# Patient Record
Sex: FEMALE | Race: BLACK OR AFRICAN AMERICAN | HISPANIC OR LATINO | Employment: FULL TIME | URBAN - METROPOLITAN AREA
[De-identification: names, ages, dates, MRNs, and addresses within clinical notes are randomized per-mention and may not be internally consistent; named-entity substitution may affect disease eponyms.]

---

## 2017-05-14 ENCOUNTER — HOSPITAL ENCOUNTER (EMERGENCY)
Facility: HOSPITAL | Age: 32
Discharge: HOME | End: 2017-05-14
Attending: EMERGENCY MEDICINE

## 2017-05-14 ENCOUNTER — APPOINTMENT (EMERGENCY)
Dept: RADIOLOGY | Facility: HOSPITAL | Age: 32
End: 2017-05-14

## 2017-05-14 VITALS
HEART RATE: 94 BPM | OXYGEN SATURATION: 100 % | DIASTOLIC BLOOD PRESSURE: 84 MMHG | SYSTOLIC BLOOD PRESSURE: 148 MMHG | WEIGHT: 163 LBS | RESPIRATION RATE: 18 BRPM | TEMPERATURE: 98.1 F

## 2017-05-14 DIAGNOSIS — S89.92XA LEFT KNEE INJURY, INITIAL ENCOUNTER: Primary | ICD-10-CM

## 2017-05-14 PROCEDURE — 73564 X-RAY EXAM KNEE 4 OR MORE: CPT

## 2017-05-14 PROCEDURE — 99283 EMERGENCY DEPT VISIT LOW MDM: CPT

## 2017-05-14 RX ORDER — NAPROXEN 250 MG/1
500 TABLET ORAL ONCE
Status: COMPLETED | OUTPATIENT
Start: 2017-05-14 | End: 2017-05-14

## 2017-05-14 RX ORDER — NAPROXEN 500 MG/1
500 TABLET ORAL 2 TIMES DAILY WITH MEALS
Qty: 14 TABLET | Refills: 0 | Status: ON HOLD | OUTPATIENT
Start: 2017-05-14 | End: 2017-12-04

## 2017-05-14 RX ADMIN — NAPROXEN 500 MG: 250 TABLET ORAL at 18:58

## 2017-05-18 ENCOUNTER — APPOINTMENT (OUTPATIENT)
Dept: OCCUPATIONAL MEDICINE | Facility: CLINIC | Age: 32
End: 2017-05-18
Payer: OTHER MISCELLANEOUS

## 2017-05-18 PROCEDURE — 99213 OFFICE O/P EST LOW 20 MIN: CPT

## 2017-06-14 ENCOUNTER — ALLSCRIPTS OFFICE VISIT (OUTPATIENT)
Dept: OTHER | Facility: OTHER | Age: 32
End: 2017-06-14

## 2017-07-12 ENCOUNTER — ALLSCRIPTS OFFICE VISIT (OUTPATIENT)
Dept: OTHER | Facility: OTHER | Age: 32
End: 2017-07-12

## 2017-11-13 ENCOUNTER — OFFICE VISIT (OUTPATIENT)
Dept: URGENT CARE | Facility: MEDICAL CENTER | Age: 32
End: 2017-11-13
Payer: COMMERCIAL

## 2017-11-13 PROCEDURE — S9088 SERVICES PROVIDED IN URGENT: HCPCS

## 2017-11-13 PROCEDURE — 99213 OFFICE O/P EST LOW 20 MIN: CPT

## 2017-11-14 NOTE — PROGRESS NOTES
Assessment    1  Acute meniscal tear, medial (836 0) (S83 249A)    Plan  Acute meniscal tear, medial    · *1 - [facility]  *  Status: Active  Requested for:13Nov2017  Care Summary provided  : Yes   · Hinged Knee Brace; Status:Complete;   Done: [id number]  she has a meniscal tear on MRI  needs ortho eval and treatment  Chief Complaint    1  Knee Pain  Chief Complaint Free Text Note Form: Left knee pain since May saw PCP for treatment still in pain  History of Present Illness  HPI: 28-year-old female presents with continuing with knee pain  She says she was in a work accident in May  She was seen by workman's comp and orthopedics  She had an MRI with Orthopedics did not have the MRI results  She has continued naproxen with some relief  She says the knee will swell and get inflamed and then get better  She has pain with squatting stair climbing and most movement  Hospital Based Practices Required Assessment:  Pain Assessment  the patient states they have pain  The pain is located in the left knee  The patient describes the pain as sharp, dull and aching  (on a scale of 0 to 10, the patient rates the pain at 4 )  Abuse And Domestic Violence Screen   Yes, the patient is safe at home  -- The patient states no one is hurting them  Depression And Suicide Screen  No, the patient has not had thoughts of hurting themself  No, the patient has not felt depressed in the past 7 days  Prefered [place]  [facility] [place]  Primary Language is  [place]  Readiness To Learn: Receptive  Barriers To Learning: none  Preferred Learning: verbal  Education Completed: disease/condition,-- medications-- and-- treatment/procedure  Teaching Method: verbal  Person Taught: patient  Evaluation Of Learning: verbalized/demonstrated understanding      Active Problems  1  Left knee pain (719 46) (M25 562)    Social History   · Current every day smoker (305 1) (F17 200)    Current Meds   1   No Reported Medications Recorded    Allergies    1  TraMADol HCl TABS    Vitals  Signs   Recorded: [id number] 11:54AM   Temperature: 98 3 F, Temporal  Heart Rate: 74, L Radial  Pulse Quality: Normal, L Radial  Respiration Quality: Normal  Respiration: 18  Systolic: 434, RUE, Sitting  Diastolic: 89, RUE, Sitting  Height: 5 ft 4 in  Weight: 166 lb 8 oz  BMI Calculated: 28 58  BSA Calculated: 1 81  O2 Saturation: 99, RA  Pain Scale: 3    Physical Exam   Constitutional  General appearance: No acute distress, well appearing and well nourished  Musculoskeletal  Inspection/palpation of joints, bones, and muscles: Abnormal  -- Left knee tender palpation along the medial joint line  She is nontender laterally  She has full range of motion  She has pain with valgus  She has pain with Quintin's with no pop  Negative drawers  Message  Return to work or school:   Paul Pepe is under my professional care  She was seen in my office on 11/13/17     She is able to work with limitations (no squatting  no work below waist level  rest knee as needed  )           Signatures   Electronically signed by : Rigo Li, [facility]; Nov 13 2017 12:57PM EST                       (Author)    Electronically signed by : ROSA Green ; Nov 13 2017  5:16PM EST                       (Co-author)

## 2017-11-22 ENCOUNTER — ALLSCRIPTS OFFICE VISIT (OUTPATIENT)
Dept: OTHER | Facility: OTHER | Age: 32
End: 2017-11-22

## 2017-11-22 ENCOUNTER — TRANSCRIBE ORDERS (OUTPATIENT)
Dept: ADMINISTRATIVE | Facility: HOSPITAL | Age: 32
End: 2017-11-22

## 2017-11-22 DIAGNOSIS — M25.562 PAIN IN LEFT KNEE: ICD-10-CM

## 2017-11-22 DIAGNOSIS — Z47.89 ENCOUNTER FOR OTHER ORTHOPEDIC AFTERCARE (CODE): ICD-10-CM

## 2017-11-22 DIAGNOSIS — M25.562 LEFT KNEE PAIN, UNSPECIFIED CHRONICITY: Primary | ICD-10-CM

## 2017-11-23 NOTE — PROGRESS NOTES
Assessment    1  Left knee pain (811 23) (D15 810)    Plan  Left knee pain    · Start: Naproxen 500 MG Oral Tablet; TAKE 1 TABLET EVERY 12 HOURS AS NEEDED   · * MRI KNEE LEFT  WO CONTRAST; Status:Active; Requested for:22Nov2017;     Discussion/Summary  Worsening of the left medial aspect knee pain  I am concerned for propagation of a medial meniscus tear  Plan is as follows:  Weightbearing as tolerated  Will obtain MRI to evaluate patient's tear  Pain medication p r n  Will follow up with patient up MRIs been obtained  Discussed treatment plan with patient and she is in agreement treatment plan  Thank you      Chief Complaint    1  Knee Pain    History of Present Illness  HPI: 59-year-old female with small undersurface tear of the medial meniscus of the left knee  Patient did very well with conservative treatment for the past 6 months  Patient states over the past month she has had increased swelling doing her work activities as well as significant medial joint line pain  Denies any numbness tingling fevers chills  She does take anti-inflammatory medication which takes the edge off the pain      Review of Systems   Constitutional: No fever, no chills, feels well, no tiredness, no recent weight gain or loss  Eyes: No complaints of eyesight problems, no red eyes  ENT: no loss of hearing, no nosebleeds, no sore throat  Cardiovascular: No complaints of chest pain, no palpitations, no leg claudication or lower extremity edema  Respiratory: no compliants of shortness of breath, no wheezing, no cough  Gastrointestinal: no complaints of abdominal pain, no constipation, no nausea or diarrhea, no vomiting, no bloody stools  Genitourinary: no complaints of dysuria, no incontinence  Musculoskeletal: as noted in HPI  Integumentary: no complaints of skin rash or lesion, no itching or dry skin, no skin wounds  Neurological: no complaints of headache, no confusion, no numbness or tingling, no dizziness    Endocrine: No complaints of muscle weakness, no feelings of weakness, no frequent urination, no excessive thirst   Psychiatric: No suicidal thoughts, no anxiety, no feelings of depression  Active Problems  1  Acute meniscal tear, medial (836 0) (S83 249A)  2  Left knee pain (719 46) (M25 562)    Past Medical History    The active problems and past medical history were reviewed and updated today  Surgical History    The surgical history was reviewed and updated today  Family History    The family history was reviewed and updated today  Social History   · Current every day smoker (305 1) (F17 200)  The social history was reviewed and updated today  Current Meds  1  No Reported Medications Recorded    The medication list was reviewed and updated today  Allergies  1  TraMADol HCl TABS    Vitals  Signs     Heart Rate: 85  Respiration: 18  Systolic: 839  Diastolic: 85  Weight: 069 lb 2 08 oz    Physical Exam  Left knee:  No abrasions, no open wounds, mild effusion, medial joint line tenderness, no pain with valgus stress, no lateral joint line tenderness, full range of motion, neurologically and vascularly intact distally  Right knee:  No abrasions, no open wounds, medial joint line tenderness, no lateral joint line tenderness, stable to coronal sagittal plane stress, neurologically and vascularly intact distally      Future Appointments    Date/Time Provider Specialty Site   11/29/2017 03:40 PM VAISHNAVI Chawla   Orthopedic [address] [place] [address] AND SURGICAL [facility]       Signatures   Electronically signed by : VAISHNAVI Barlow ; Nov 22 2017  4:57PM EST                       (Author)

## 2017-11-26 ENCOUNTER — HOSPITAL ENCOUNTER (OUTPATIENT)
Dept: MRI IMAGING | Facility: HOSPITAL | Age: 32
Discharge: HOME | End: 2017-11-26
Attending: ORTHOPAEDIC SURGERY
Payer: COMMERCIAL

## 2017-11-26 DIAGNOSIS — M25.562 PAIN IN LEFT KNEE: ICD-10-CM

## 2017-11-26 PROCEDURE — 73721 MRI JNT OF LWR EXTRE W/O DYE: CPT

## 2017-11-29 ENCOUNTER — ALLSCRIPTS OFFICE VISIT (OUTPATIENT)
Dept: OTHER | Facility: OTHER | Age: 32
End: 2017-11-29

## 2017-12-03 ENCOUNTER — ANESTHESIA EVENT (OUTPATIENT)
Dept: PERIOP | Facility: HOSPITAL | Age: 32
End: 2017-12-03
Payer: OTHER MISCELLANEOUS

## 2017-12-04 ENCOUNTER — HOSPITAL ENCOUNTER (OUTPATIENT)
Facility: HOSPITAL | Age: 32
Setting detail: OUTPATIENT SURGERY
Discharge: HOME | End: 2017-12-04
Attending: ORTHOPAEDIC SURGERY | Admitting: ORTHOPAEDIC SURGERY
Payer: OTHER MISCELLANEOUS

## 2017-12-04 ENCOUNTER — ANESTHESIA (OUTPATIENT)
Dept: PERIOP | Facility: HOSPITAL | Age: 32
End: 2017-12-04
Payer: OTHER MISCELLANEOUS

## 2017-12-04 VITALS
HEIGHT: 64 IN | SYSTOLIC BLOOD PRESSURE: 135 MMHG | WEIGHT: 167 LBS | DIASTOLIC BLOOD PRESSURE: 71 MMHG | OXYGEN SATURATION: 100 % | HEART RATE: 80 BPM | RESPIRATION RATE: 20 BRPM | BODY MASS INDEX: 28.51 KG/M2 | TEMPERATURE: 99.8 F

## 2017-12-04 DIAGNOSIS — Z98.890 STATUS POST ARTHROSCOPY OF LEFT KNEE: Primary | ICD-10-CM

## 2017-12-04 LAB — EXT PREGNANCY TEST URINE: NEGATIVE

## 2017-12-04 PROCEDURE — 81025 URINE PREGNANCY TEST: CPT | Performed by: ORTHOPAEDIC SURGERY

## 2017-12-04 RX ORDER — LIDOCAINE HYDROCHLORIDE AND EPINEPHRINE 10; 10 MG/ML; UG/ML
INJECTION, SOLUTION INFILTRATION; PERINEURAL AS NEEDED
Status: DISCONTINUED | OUTPATIENT
Start: 2017-12-04 | End: 2017-12-04 | Stop reason: HOSPADM

## 2017-12-04 RX ORDER — LIDOCAINE HYDROCHLORIDE 10 MG/ML
INJECTION, SOLUTION INFILTRATION; PERINEURAL AS NEEDED
Status: DISCONTINUED | OUTPATIENT
Start: 2017-12-04 | End: 2017-12-04 | Stop reason: SURG

## 2017-12-04 RX ORDER — FENTANYL CITRATE 50 UG/ML
INJECTION, SOLUTION INTRAMUSCULAR; INTRAVENOUS AS NEEDED
Status: DISCONTINUED | OUTPATIENT
Start: 2017-12-04 | End: 2017-12-04 | Stop reason: SURG

## 2017-12-04 RX ORDER — FENTANYL CITRATE/PF 50 MCG/ML
25 SYRINGE (ML) INJECTION
Status: CANCELLED | OUTPATIENT
Start: 2017-12-04

## 2017-12-04 RX ORDER — LIDOCAINE HYDROCHLORIDE 10 MG/ML
INJECTION, SOLUTION INFILTRATION; PERINEURAL AS NEEDED
Status: DISCONTINUED | OUTPATIENT
Start: 2017-12-04 | End: 2017-12-04 | Stop reason: HOSPADM

## 2017-12-04 RX ORDER — OXYCODONE HYDROCHLORIDE 5 MG/1
TABLET ORAL
Qty: 15 TABLET | Refills: 0 | Status: SHIPPED | OUTPATIENT
Start: 2017-12-04 | End: 2018-02-16

## 2017-12-04 RX ORDER — ONDANSETRON 2 MG/ML
4 INJECTION INTRAMUSCULAR; INTRAVENOUS EVERY 4 HOURS PRN
Status: CANCELLED | OUTPATIENT
Start: 2017-12-04

## 2017-12-04 RX ORDER — BUPIVACAINE HYDROCHLORIDE 2.5 MG/ML
INJECTION, SOLUTION INFILTRATION; PERINEURAL AS NEEDED
Status: DISCONTINUED | OUTPATIENT
Start: 2017-12-04 | End: 2017-12-04 | Stop reason: HOSPADM

## 2017-12-04 RX ORDER — SODIUM CHLORIDE, SODIUM LACTATE, POTASSIUM CHLORIDE, CALCIUM CHLORIDE 600; 310; 30; 20 MG/100ML; MG/100ML; MG/100ML; MG/100ML
125 INJECTION, SOLUTION INTRAVENOUS CONTINUOUS
Status: DISCONTINUED | OUTPATIENT
Start: 2017-12-04 | End: 2017-12-04 | Stop reason: HOSPADM

## 2017-12-04 RX ORDER — PROPOFOL 10 MG/ML
INJECTION, EMULSION INTRAVENOUS AS NEEDED
Status: DISCONTINUED | OUTPATIENT
Start: 2017-12-04 | End: 2017-12-04 | Stop reason: SURG

## 2017-12-04 RX ORDER — ONDANSETRON 2 MG/ML
INJECTION INTRAMUSCULAR; INTRAVENOUS AS NEEDED
Status: DISCONTINUED | OUTPATIENT
Start: 2017-12-04 | End: 2017-12-04 | Stop reason: SURG

## 2017-12-04 RX ORDER — MIDAZOLAM HYDROCHLORIDE 1 MG/ML
INJECTION INTRAMUSCULAR; INTRAVENOUS AS NEEDED
Status: DISCONTINUED | OUTPATIENT
Start: 2017-12-04 | End: 2017-12-04 | Stop reason: SURG

## 2017-12-04 RX ORDER — DIPHENHYDRAMINE HYDROCHLORIDE 50 MG/ML
12.5 INJECTION INTRAMUSCULAR; INTRAVENOUS ONCE AS NEEDED
Status: CANCELLED | OUTPATIENT
Start: 2017-12-04

## 2017-12-04 RX ORDER — SODIUM CHLORIDE, SODIUM LACTATE, POTASSIUM CHLORIDE, CALCIUM CHLORIDE 600; 310; 30; 20 MG/100ML; MG/100ML; MG/100ML; MG/100ML
20 INJECTION, SOLUTION INTRAVENOUS CONTINUOUS
Status: DISCONTINUED | OUTPATIENT
Start: 2017-12-04 | End: 2017-12-04 | Stop reason: HOSPADM

## 2017-12-04 RX ORDER — NAPROXEN 500 MG/1
500 TABLET ORAL AS NEEDED
COMMUNITY
End: 2018-02-16

## 2017-12-04 RX ORDER — MEPERIDINE HYDROCHLORIDE 25 MG/ML
12.5 INJECTION INTRAMUSCULAR; INTRAVENOUS; SUBCUTANEOUS
Status: CANCELLED | OUTPATIENT
Start: 2017-12-04

## 2017-12-04 RX ORDER — FENTANYL CITRATE/PF 50 MCG/ML
25 SYRINGE (ML) INJECTION
Status: DISCONTINUED | OUTPATIENT
Start: 2017-12-04 | End: 2017-12-04 | Stop reason: HOSPADM

## 2017-12-04 RX ADMIN — LIDOCAINE HYDROCHLORIDE 50 MG: 10 INJECTION, SOLUTION INFILTRATION; PERINEURAL at 13:02

## 2017-12-04 RX ADMIN — PROPOFOL 200 MG: 10 INJECTION, EMULSION INTRAVENOUS at 13:02

## 2017-12-04 RX ADMIN — SODIUM CHLORIDE, SODIUM LACTATE, POTASSIUM CHLORIDE, AND CALCIUM CHLORIDE: .6; .31; .03; .02 INJECTION, SOLUTION INTRAVENOUS at 12:51

## 2017-12-04 RX ADMIN — FENTANYL CITRATE 25 MCG: 50 INJECTION, SOLUTION INTRAMUSCULAR; INTRAVENOUS at 13:10

## 2017-12-04 RX ADMIN — FENTANYL CITRATE 25 MCG: 50 INJECTION INTRAMUSCULAR; INTRAVENOUS at 14:21

## 2017-12-04 RX ADMIN — ONDANSETRON 4 MG: 2 INJECTION INTRAMUSCULAR; INTRAVENOUS at 13:02

## 2017-12-04 RX ADMIN — FENTANYL CITRATE 25 MCG: 50 INJECTION INTRAMUSCULAR; INTRAVENOUS at 14:15

## 2017-12-04 RX ADMIN — SODIUM CHLORIDE, SODIUM LACTATE, POTASSIUM CHLORIDE, AND CALCIUM CHLORIDE 125 ML/HR: .6; .31; .03; .02 INJECTION, SOLUTION INTRAVENOUS at 08:23

## 2017-12-04 RX ADMIN — MIDAZOLAM HYDROCHLORIDE 2 MG: 1 INJECTION, SOLUTION INTRAMUSCULAR; INTRAVENOUS at 12:55

## 2017-12-04 RX ADMIN — FENTANYL CITRATE 25 MCG: 50 INJECTION, SOLUTION INTRAMUSCULAR; INTRAVENOUS at 13:34

## 2017-12-04 RX ADMIN — FENTANYL CITRATE 25 MCG: 50 INJECTION, SOLUTION INTRAMUSCULAR; INTRAVENOUS at 13:18

## 2017-12-04 RX ADMIN — FENTANYL CITRATE 25 MCG: 50 INJECTION INTRAMUSCULAR; INTRAVENOUS at 14:24

## 2017-12-04 NOTE — DISCHARGE INSTRUCTIONS
Discharge Instructions - [address] 28 y o  female MRN: [id number]  Unit/Bed#: PACU 02    Weight Bearing Status:                                           Weight bearing as tolerated left lower extremtiy    Pain:  Continue analgesics as directed    Dressing Instructions:   Remove dressings on 12/07/2017  Keep incision clean, dry and intact until follow up appointment  PT/OT:  Continue PT/OT on outpatient basis as directed    Appt Instructions: If you do not have your appointment, please call the clinic at [phone] to f/u with Dr Ayush Méndez in 2 weeks  Otherwise followup as scheduled below:      Contact the office sooner if you experience any increased numbness/tingling in the extremities

## 2017-12-04 NOTE — ANESTHESIA POSTPROCEDURE EVALUATION
Post-Op Assessment Note      CV Status:  Stable    Mental Status:  Alert and awake    Hydration Status:  Euvolemic    PONV Controlled:  Controlled    Airway Patency:  Patent    Post Op Vitals Reviewed: Yes          Staff: CRNA           BP   126/68   Temp   98 0   Pulse  92   Resp 17   SpO2 98%

## 2017-12-04 NOTE — OP NOTE
OPERATIVE REPORT  PATIENT NAME: Augie Reeves    :  1985  MRN: [id number]  Pt Location:  OR ROOM 04    SURGERY DATE: 2017    Surgeon(s) and Role:     * Carlin Velasquez MD - Primary     * TIA Posey-C - [address] [place] [address], MD - Assisting    Preop Diagnosis:  Other tear of medial meniscus, current injury, unspecified knee, initial encounter [S83 249A]    Post-Op Diagnosis Codes:     * Other tear of medial meniscus, current injury, unspecified knee, initial encounter [S83 249A]    Procedure(s) (LRB):  KNEE ARTHROSCOPY; CHONDROPLASTY; SYNOVECTOMY (Left)    Specimen(s):  * No specimens in log *    Estimated Blood Loss:   Minimal    Drains:       Anesthesia Type:   General    Operative Indications: Other tear of medial meniscus, current injury, unspecified knee, initial encounter [S83 249A]    Medial femoral condyle chondral defect, medial plateau chondral defect  Loose body, synovitis    Operative Findings:  ACL/PCL intact  Lateral femoral condyle and lateral tibial plateau normal  Medial femoral condyle with chondral defect to with type 2 chondromalacia at least 5 mm circumferential with chondral flap more inferiorly  Medial tibial plateau 1 5 mm chondral defect  Medial meniscus intact  Lateral meniscus intact  Patellofemoral joint normal  Loose bodies noted within the suprapatellar pouch  Inflamed synovium    Complications:   None    Procedure and Technique:  Patient was seen while in the holding area with the procedure as well as indication was explained the patient's left knee was marked  Patient was then subsequently seen by the anesthesia team and taken into the OR  Patient was then transferred to the OR bed supine  Patient was then sedated and LMA was placed  A tourniquet was placed at the superior aspect of the thigh although not inflated  A bolster was placed lateral to the leg  The distal aspect of the table was then lowered   The left knee was then sterilely prepped and draped  Arthroscopy tower with monitor was available within the room  A time-out was then performed and patient, indication and procedure were all correct  The patient did receive preoperative antibiotics    Initially lidocaine with epinephrine was injected for both the medial and lateral portal   the lateral portal was established  The trocar was then inserted into the suprapatellar pouch followed by the camera  The knee was then insufflated with irrigant  Diagnostic arthroscopy was then performed initially starting with the patellofemoral joint, lateral gutter,  lateral joint, the notch and subsequent the medial joint and medial gutter images were taken throughout arthroscopy  Within the suprapatellar posterior was a loose body, the lateral joint was then visualized demonstrating an intact lateral meniscus and no chondral damage to the lateral femoral condyle and lateral plateau  The ACL and PCL were both intact within the notch  The medial joint was then visualized demonstrating chondral defect to the medial femoral condyle as well as a small pit within the medial tibial plateau  The medial meniscus was intact throughout  There was inflamed synovium noted medially  Our medial portal was then established initially with a spinal needle followed by a skin knife and then the shaver was inserted  Fat pad  was then debrided  for more visualization  Probe was then inserted again demonstrating stable meniscus and no tear  The chondral defect measuring 5 mm was then probed and there was type 2 chondromalacia noted and a small flap inferiorly which appeared stable edges were then debrided  Arthrocare Wand was then inserted and chondroplasty and synovectomy were then performed  Images were taken confirming stable lesion  There was a small lesion noted on the medial plateau which was stable  The loose bodies were then debrided and removed  Final pictures were then taken    The joint was then copiously irrigated and fluid was then removed from the knee  The skin incisions were then closed with 2 O nylon  The joint was then injected with 10 cc of lidocaine mixed with epinephrine  Skin was then cleaned and sterile dressings were applied consisting of Xeroform, 4 x 4, ABD and Webril  A 6 inch Ace was then applied  This is Dr Liyah Butterfield dictating for Dr Deepti Pelayo who was present for the entire procedure       Patient Disposition:  PACU  and extubated and stable    SIGNATURE: Liyah Butterfield MD  DATE: December 4, 2017  TIME: 2:06 PM

## 2017-12-04 NOTE — ANESTHESIA PREPROCEDURE EVALUATION
Review of Systems/Medical History  Patient summary reviewed  Chart reviewed  No history of anesthetic complications     Cardiovascular  Negative cardio ROS Exercise tolerance: good,     Pulmonary  Negative pulmonary ROS ,        GI/Hepatic  Negative GI/hepatic ROS          Negative  ROS        Endo/Other  Negative endo/other ROS      GYN       Hematology  Negative hematology ROS      Musculoskeletal    Comment: Knee injury      Neurology  Negative neurology ROS      Psychology   Negative psychology ROS            Physical Exam    Airway    Mallampati score: III  TM Distance: >3 FB  Neck ROM: full     Dental   No notable dental hx     Cardiovascular  Comment: Negative ROS, Cardiovascular exam normal    Pulmonary  Pulmonary exam normal     Other Findings        Anesthesia Plan  ASA Score- 1       Anesthesia Type- general with ASA Monitors  Additional Monitors:   Airway Plan: ETT and LMA  Comment: I, Dr Carlos Lowe, the attending physician, has personally seen and evaluated the patient prior to anesthetic care  I have reviewed the pre-anesthetic record, and other medical records if appropriate to the anesthetic care  Risks and benefits discussed with patient; patient consented and agrees to proceed  If a CRNA is involved in the case, I have reviewed the CRNA assessment, if present, and agree  The patient is in a suitable condition to proceed with my formulated anesthetic plan          Induction- intravenous  Informed Consent- Anesthetic plan and risks discussed with patient  I personally reviewed this patient with the CRNA  Discussed and agreed on the Anesthesia Plan with the CRNA  Meghana Oliver

## 2017-12-20 ENCOUNTER — ALLSCRIPTS OFFICE VISIT (OUTPATIENT)
Dept: OTHER | Facility: OTHER | Age: 32
End: 2017-12-20

## 2017-12-20 ENCOUNTER — GENERIC CONVERSION - ENCOUNTER (OUTPATIENT)
Dept: OTHER | Facility: OTHER | Age: 32
End: 2017-12-20

## 2017-12-29 ENCOUNTER — GENERIC CONVERSION - ENCOUNTER (OUTPATIENT)
Dept: OBGYN CLINIC | Facility: HOSPITAL | Age: 32
End: 2017-12-29

## 2017-12-29 ENCOUNTER — APPOINTMENT (OUTPATIENT)
Dept: PHYSICAL THERAPY | Facility: CLINIC | Age: 32
End: 2017-12-29
Payer: OTHER MISCELLANEOUS

## 2017-12-29 DIAGNOSIS — Z47.89 ENCOUNTER FOR OTHER ORTHOPEDIC AFTERCARE (CODE): ICD-10-CM

## 2017-12-29 PROCEDURE — G8990 OTHER PT/OT CURRENT STATUS: HCPCS

## 2017-12-29 PROCEDURE — G8991 OTHER PT/OT GOAL STATUS: HCPCS

## 2017-12-29 PROCEDURE — 97161 PT EVAL LOW COMPLEX 20 MIN: CPT

## 2018-01-05 ENCOUNTER — APPOINTMENT (OUTPATIENT)
Dept: PHYSICAL THERAPY | Facility: CLINIC | Age: 33
End: 2018-01-05
Payer: OTHER MISCELLANEOUS

## 2018-01-05 ENCOUNTER — APPOINTMENT (OUTPATIENT)
Dept: PHYSICAL THERAPY | Facility: MEDICAL CENTER | Age: 33
End: 2018-01-05

## 2018-01-10 ENCOUNTER — APPOINTMENT (OUTPATIENT)
Dept: PHYSICAL THERAPY | Facility: CLINIC | Age: 33
End: 2018-01-10
Payer: OTHER MISCELLANEOUS

## 2018-01-10 PROCEDURE — 97110 THERAPEUTIC EXERCISES: CPT

## 2018-01-10 PROCEDURE — 97140 MANUAL THERAPY 1/> REGIONS: CPT

## 2018-01-12 VITALS
HEART RATE: 85 BPM | SYSTOLIC BLOOD PRESSURE: 133 MMHG | RESPIRATION RATE: 18 BRPM | DIASTOLIC BLOOD PRESSURE: 85 MMHG | WEIGHT: 171.13 LBS

## 2018-01-12 NOTE — PROGRESS NOTES
Preliminary Nursing Report                Patient Information    Initial Encounter Entry Date:   2017 4:49 PM EST (Automated Transmission Automated Transmission)       Last Modified:   {ParH  ModifiedOn}              Legal Name: Yomaira Klein        Social Security Number:        YOB: 1985        Age (years): 28        Gender: F        Body Mass Index (BMI): 29 kg/m2        Height: 64 in  Weight: 171 lbs (78 kgs)           Address:   [address] [place] [address]              Phone: [phone]   (consent to leave messages)        Email:        Ethnicity: Decline to State        Adventism:        Marital Status:        Preferred Language: English        Race: Other Race                    Patient Insurance Information        Primary Insurance Information Carrier Name: {Primary  CarrierName}           Carrier Address:   {Primary  CarrierAddress}              Carrier Phone: {Primary  CarrierPhone}          Group Number: {Primary  GroupNumber}          Policy Number: {Primary  PolicyNumber}          Insured Name: {Primary  InsuredName}          Insured : {Primary  InsuredDOB}          Relationship to Insured: {Primary  RelationshiptoInsured}           Secondary Insurance Information Carrier Name: {Secondary  CarrierName}           Carrier Address:   {Secondary  CarrierAddress}              Carrier Phone: {Secondary  CarrierPhone}          Group Number: {Secondary  GroupNumber}          Policy Number: {Secondary  PolicyNumber}          Insured Name: {Secondary  InsuredName}          Insured : {Secondary  InsuredDOB}          Relationship to Insured: {Secondary  RelationshiptoInsured}                       Health Profile   Booking #:   Anu Jefferson #: [id number]               DOS: 2017    Surgery : KNEE ARTHROSCOPY/SURGERY    Hira Po     Add'l Procedures/Notes:     Surgery Risk: Intermediate          Precautions          Allergies    TraMADol HCl TABS Clinical Comments: Reaction Type: , Reaction: , Severity:              Medications    Naproxen 500 MG Oral Tablet               Conditions    Acute meniscal tear, medial       Left knee pain               Family History    None             Surgical History    None             Social History    Current every day smoker                               Patient Instructions       Medical Procedure Risk  NPO Instructions   The day before surgery it is recommended to have a light dinner at your usual time and you are allowed a light snack early in the evening  Do not eat anything heavy or eat a big meal after 7pm  Do not eat or drink anything after midnight prior to your surgery  If you are supposed to take any of your medications, do so with a sip of water  Failure to follow these instructions can lead to an increased risk of lung complications and may result in a delay or cancellation of your procedure  If you have any questions, contact your institution for further instructions  No candy, no gum, no mints, no chewing tobacco          Naproxen 500 MG Oral Tablet  Medication Instruction (NSAID - Pain Medication) 61  Please stop ibuprofen, naproxen and other non-steroidal anti-inflammatory drugs (NSAIDS) for 24 hrs before surgery  Current every day smoker  Smoking Cessation   Smoking before and after surgery can lead to complications  Patients who quit smoking at least eight weeks before surgery have complication rates almost as low as non-smokers  Smokers who can stop smoking 24 or 48 hours before surgery may also benefit from decreased amounts of nicotine and carbon monoxide in the body  For help quitting smoking, speak with your physician or contact the [facility] Stephen or American [employer] Association  Please visit the following web address for assistance with quitting  [facility] Signal Patterns  [facility]/Anesthesia-Topics/Lvp-Wuj-oz-Quit-Smoking  aspx  Testing Considerations       ? Pregnancy Test t  Consider a urine pregnancy test on the morning of surgery  Triggered by: Age or Facility Rec, Gender               Consultations       No recommendations for this classification  Miscellaneous Questions         Question: Are you able to walk up a flight of stairs, walk up a hill or do heavy housework WITHOUT having chest pain or shortness of breath? Answer: YES                   Allergies/Conditions/Medications Not Found        The following were not recognized by our system when generating the recommendations  Please consider if this would impact any preoperative protocols  None                  Appointment Information         Date:    12/04/2017        Location:    [facility]        Address:           Directions:                      Footnotes revision 14      ?? Denotes a free-text entry  Legal Disclaimer: Any and all recommendations and services provided herein are designed to assist in the preoperative care of the patient  Nothing contained herein is designed to replace, eliminate or alleviate the responsibility of the attending physician to supervise and determine the patient?s preoperative care and course of treatment  Failure to provide complete, accurate information may negatively impact the system?s ability to recommend the proper preoperative protocol  THE ATTENDING PHYSICIAN IS RESPONSIBLE TO REVIEW THE SUGGESTED PREOPERATIVE PROTOCOLS/COURSE OF TREATMENT AND PRESCRIBE THE FINAL COURSE OF PREOPERATIVE TREATMENT IN CONSULTATION WITH THE PATIENT  THE ePREOP SYSTEM AND ITS MATERIALS ARE PROVIDED ? AS IS? WITHOUT WARRANTY OF ANY KIND, EXPRESS OR IMPLIED, INCLUDING, BUT NOT LIMITED TO, WARRANTIES OF PERFORMANCE OR MERCHANTABILITY OR FITNESS FOR A PARTICULAR PURPOSE  PATIENT AND PHYSICIANS HEREBY AGREE THAT THEIR USE OF THE MATERIALS AND RESOURCES ACT AS A CONSENT TO RELEASE AND WAIVE ePREOP FROM ANY AND ALL CLAIMS OF WARRANTY, TORT OR CONTRACT LAW OF ANY KIND

## 2018-01-13 ENCOUNTER — APPOINTMENT (OUTPATIENT)
Dept: URGENT CARE | Facility: MEDICAL CENTER | Age: 33
End: 2018-01-13
Payer: OTHER MISCELLANEOUS

## 2018-01-13 VITALS
WEIGHT: 165.13 LBS | DIASTOLIC BLOOD PRESSURE: 90 MMHG | HEART RATE: 76 BPM | BODY MASS INDEX: 28.34 KG/M2 | RESPIRATION RATE: 18 BRPM | SYSTOLIC BLOOD PRESSURE: 127 MMHG

## 2018-01-13 VITALS
WEIGHT: 167.25 LBS | SYSTOLIC BLOOD PRESSURE: 114 MMHG | DIASTOLIC BLOOD PRESSURE: 78 MMHG | RESPIRATION RATE: 18 BRPM | HEIGHT: 64 IN | HEART RATE: 98 BPM | BODY MASS INDEX: 28.55 KG/M2

## 2018-01-13 PROCEDURE — 99213 OFFICE O/P EST LOW 20 MIN: CPT

## 2018-01-14 VITALS
BODY MASS INDEX: 28.86 KG/M2 | HEART RATE: 86 BPM | WEIGHT: 168.13 LBS | RESPIRATION RATE: 20 BRPM | SYSTOLIC BLOOD PRESSURE: 123 MMHG | DIASTOLIC BLOOD PRESSURE: 84 MMHG

## 2018-01-18 ENCOUNTER — APPOINTMENT (OUTPATIENT)
Dept: PHYSICAL THERAPY | Facility: CLINIC | Age: 33
End: 2018-01-18
Payer: OTHER MISCELLANEOUS

## 2018-01-18 PROCEDURE — 97014 ELECTRIC STIMULATION THERAPY: CPT

## 2018-01-18 PROCEDURE — 97140 MANUAL THERAPY 1/> REGIONS: CPT

## 2018-01-18 PROCEDURE — 97110 THERAPEUTIC EXERCISES: CPT

## 2018-01-18 NOTE — MISCELLANEOUS
Message  Return to work or school:   Aaliyah Madera is under my professional care  She was seen in my office on 11/13/17     She is able to work with limitations (no squatting  no work below waist level  rest knee as needed  )           Signatures   Electronically signed by : Sammy Cohen, [facility]; Nov 13 2017 12:57PM EST                       (Author)

## 2018-01-19 ENCOUNTER — APPOINTMENT (OUTPATIENT)
Dept: PHYSICAL THERAPY | Facility: CLINIC | Age: 33
End: 2018-01-19
Payer: OTHER MISCELLANEOUS

## 2018-01-19 PROCEDURE — 97014 ELECTRIC STIMULATION THERAPY: CPT

## 2018-01-19 PROCEDURE — 97110 THERAPEUTIC EXERCISES: CPT

## 2018-01-22 VITALS
SYSTOLIC BLOOD PRESSURE: 126 MMHG | RESPIRATION RATE: 18 BRPM | HEART RATE: 99 BPM | WEIGHT: 171.13 LBS | DIASTOLIC BLOOD PRESSURE: 83 MMHG | BODY MASS INDEX: 29.37 KG/M2

## 2018-01-23 NOTE — MISCELLANEOUS
Message  Return to work or school:   Marine Lee is under my professional care  She was seen in my office on 12/20/2017   She is able to return to work on  12/26/2017      Please allow Ms Harmon to return to work with light duty  I will see her in the next 4 weeks to likely return her to full duty  Thank you          Signatures   Electronically signed by : VAISHNAVI Harris ; Dec 20 2017 10:29AM EST                       (Author)    Electronically signed by : VAISHNAVI Harris ; Dec 20 2017 10:38AM EST                       (Author)

## 2018-01-23 NOTE — MISCELLANEOUS
Message  Return to work or school:   Ngoc Doardo is under my professional care  She was seen in my office on 12/20/2017     She is able to work with limitations  No bending pushing pulling kneeling squatting, standing for long periods of time and lifting greater than 20 lb until further follow-up  Samm Tavares PA-C  Signatures   Electronically signed by :  Theodora Jackson; Dec 21 2017  4:35PM EST                       (Author)

## 2018-01-24 ENCOUNTER — APPOINTMENT (OUTPATIENT)
Dept: PHYSICAL THERAPY | Facility: CLINIC | Age: 33
End: 2018-01-24
Payer: OTHER MISCELLANEOUS

## 2018-01-24 ENCOUNTER — OFFICE VISIT (OUTPATIENT)
Dept: OBGYN CLINIC | Facility: MEDICAL CENTER | Age: 33
End: 2018-01-24

## 2018-01-24 VITALS
SYSTOLIC BLOOD PRESSURE: 125 MMHG | DIASTOLIC BLOOD PRESSURE: 85 MMHG | WEIGHT: 174 LBS | BODY MASS INDEX: 29.71 KG/M2 | HEART RATE: 112 BPM | HEIGHT: 64 IN

## 2018-01-24 DIAGNOSIS — S83.412A SPRAIN OF MEDIAL COLLATERAL LIGAMENT OF LEFT KNEE, INITIAL ENCOUNTER: Primary | ICD-10-CM

## 2018-01-24 PROBLEM — Z47.89 AFTERCARE FOLLOWING OTHER SURGERY OF MUSCULOSKELETAL SYSTEM: Status: ACTIVE | Noted: 2018-01-24

## 2018-01-24 PROCEDURE — 99024 POSTOP FOLLOW-UP VISIT: CPT | Performed by: ORTHOPAEDIC SURGERY

## 2018-01-24 NOTE — LETTER
January 24, 2018     Patient: Micheal Olsen   YOB: 1985   Date of Visit: 1/24/2018       To Whom it May Concern:    Micheal Olsen is under my professional care  She was seen in my office on 1/24/2018  She may return to work with limitations 1/25/2018  Light duty only until further follow-up in 4 weeks time  If you have any questions or concerns, please don't hesitate to call           Sincerely,          Rylee Whitley MD        CC: No Recipients

## 2018-01-24 NOTE — PROGRESS NOTES
Rachelle Burt 28 y o  female MRN: [id number]  1/24/2018    Discussion/Summary    28 y  o female status post left knee arthroscopy partial Medial meniscectomy  MCL strain from recent fall at work    His knee brace well at work    Weightbearing as tolerated lower extremity    Physical therapy range of motion strengthening    Follow-up in 4 weeks time for repeat evaluation and possible advancement of work restrictions    Chief Complaint   Knee Pain    Post-Op  Post-Op Arthroscopic Knee:   Rachelle Burt is status post left knee arthroscopy partial Medial meniscectomy  HPI: The patient reports no excessive pain, no swelling, no fever, no calf swelling, no leg pain and no shortness of breath  The patient also reports weight-bearing as tolerated  Patient states she did have another fall at work several days ago she noticed increasing pain in medial aspect of her left knee  She states occasionally having instability in her left knee  Denies any clicking popping catching of the left knee     PE:   --------------------            01/24/18               1054     --------------------   BP:       125/85     Pulse:   (!) 112    --------------------  The surgical incision site was clean, dry and intact  The left knee demonstrates no warmth, no induration, no erythema, no ecchymosis, no swelling and no tenderness  -ROM as expected given post-op status  0-120 degrees with some pain at flexion The patient is progressing well with ROM  -Strength as expected given post-op status  The patient is progressing well with strength  Pain with valgus stress pain palpation MCL no pain palpation lateral joint line  -Tests for DVT and compartment syndrome are both negative with soft and non-tender calves and no palpable cords  Special tests are deferred    -Peripheral neurovascular exam reveals intact sensation to light touch and intact gross motor function       Assessment: Post-op, the patient is doing well, has strained her MCL and is showing no signs of infection  Plan: Activity Restrictions: Advance as tolerated  Hinged knee brace while at work  Light duty only at work x4 weeks    Current Outpatient Prescriptions:  naproxen (NAPROSYN) 500 mg tablet, Take 500 mg by mouth as needed for mild pain, Disp: , Rfl:   oxyCODONE (ROXICODONE) 5 mg immediate release tablet, Take 1-2 tablets every 4-6 hrs as needed for pain control, Disp: 15 tablet, Rfl: 0    No current facility-administered medications for this visit          -- Tramadol -- Itching    Social History

## 2018-01-25 ENCOUNTER — TELEPHONE (OUTPATIENT)
Dept: OBGYN CLINIC | Facility: HOSPITAL | Age: 33
End: 2018-01-25

## 2018-01-26 ENCOUNTER — OFFICE VISIT (OUTPATIENT)
Dept: PHYSICAL THERAPY | Facility: CLINIC | Age: 33
End: 2018-01-26
Payer: OTHER MISCELLANEOUS

## 2018-01-26 DIAGNOSIS — Z47.89 ORTHOPEDIC AFTERCARE: Primary | ICD-10-CM

## 2018-01-26 PROCEDURE — 97110 THERAPEUTIC EXERCISES: CPT | Performed by: PHYSICAL THERAPIST

## 2018-01-26 NOTE — PROGRESS NOTES
Daily Note     Today's date: 2018  Patient name: Edwina Hernandez  : 1985  MRN: [id number]  Referring provider: Sadie Bourne MD  Dx:   Encounter Diagnosis   Name Primary?  Orthopedic aftercare Yes                  Subjective: Patient reports that she is feeling much better than last session  Rates VPS 5/10 today left knee  Objective: See treatment diary below  Precautions: s/p left partial medical menisectomy 17    Daily Treatment Diary     Manual              Left knee AAROM flexion/ ext  5 min                                                                     Exercise Diary              Recumbent bike 10 min            Qs, add sets  3 sec x 20 each            slr / s/l abd  20 ea            Prone hs curls/ hip ext  20x each            Prone quad stretch w/strap 20 sec x 5            Laq/hip flexion X 20             Standing slrx 3 X 20             X 20             Standing hs curls  x 20            Step ups f/ l  X 20 each            March on foam 1 min            slb on foam  X 5             Squats @ llbar                                                                                                            Modalities              Cp L knee 10 min                                                Assessment: Tolerated treatment well  Patient exhibited good technqiue with therapeutic exercises and could benefit from continued PT      Plan: Continue per plan of care  and Progress treatment as tolerated

## 2018-01-29 ENCOUNTER — TELEPHONE (OUTPATIENT)
Dept: OBGYN CLINIC | Facility: HOSPITAL | Age: 33
End: 2018-01-29

## 2018-01-31 ENCOUNTER — OFFICE VISIT (OUTPATIENT)
Dept: PHYSICAL THERAPY | Facility: CLINIC | Age: 33
End: 2018-01-31
Payer: OTHER MISCELLANEOUS

## 2018-01-31 DIAGNOSIS — Z47.89 ORTHOPEDIC AFTERCARE: Primary | ICD-10-CM

## 2018-01-31 PROCEDURE — 97110 THERAPEUTIC EXERCISES: CPT | Performed by: PHYSICAL THERAPIST

## 2018-01-31 NOTE — PROGRESS NOTES
Daily Note     Today's date: 2018  Patient name: Edwina Hernandez  : 1985  MRN: [id number]  Referring provider: Sadie Bourne MD  Dx: No diagnosis found  Subjective: Patient reports that she is feeling better and better with less left knee pain  Objective: See treatment diary below  Manual                     Left knee AAROM flexion/ ext  5 min                                                                                                                            Exercise Diary                     Recumbent bike 10 min  10 min                   Qs, add sets  3 sec x 20 each 3sec x 20                   slr / s/l abd  20 ea  1# x20                   Prone hs curls/ hip ext  20x each  1# x 20                   Prone quad stretch w/strap 20 sec x 5  20 sec x 5                   Laq/hip flexion X 20   1# x20                   Standing slrx 3 X 20  1# x 20                    X 20                      Standing hs curls  x 20  1# x 20                   Step ups f/ l  X 20 each  1# x20 each                   March on foam 1 min X 1 min                   slb on foam  X 5   x 5                   Squats @ llbar    x 20                     leg press   80# x 20                    stand on foam ball @ tramp   bball  x 30                                                                                                                                                  Modalities                     Cp L knee 10 min  10 min                                                                          Assessment: Tolerated treatment well  Patient exhibited good technique with therapeutic exercises and would benefit from continued PT  Patient tolerated progressions well - without issue  Plan: Continue per plan of care  and Progress treatment as tolerated

## 2018-02-07 ENCOUNTER — APPOINTMENT (OUTPATIENT)
Dept: PHYSICAL THERAPY | Facility: CLINIC | Age: 33
End: 2018-02-07
Payer: OTHER MISCELLANEOUS

## 2018-02-09 ENCOUNTER — OFFICE VISIT (OUTPATIENT)
Dept: PHYSICAL THERAPY | Facility: CLINIC | Age: 33
End: 2018-02-09
Payer: OTHER MISCELLANEOUS

## 2018-02-09 DIAGNOSIS — Z47.89 ORTHOPEDIC AFTERCARE: Primary | ICD-10-CM

## 2018-02-09 PROCEDURE — 97110 THERAPEUTIC EXERCISES: CPT | Performed by: PHYSICAL THERAPIST

## 2018-02-09 NOTE — PROGRESS NOTES
Daily Note     Today's date: 2018  Patient name: Du Cameron  : 1985  MRN: [id number]  Referring provider: Ruba Rodriguez MD  Dx:   Encounter Diagnosis   Name Primary?  Orthopedic aftercare Yes                  Subjective: Patient states she is feeling good today  No reports of pain  Objective: See treatment diary below                 Left knee AAROM flexion/ ext  5 min                                                                                                                            Exercise Diary    2/9                 Recumbent bike 10 min  10 min  10'                 Qs, add sets  3 sec x 20 each 3sec x 20 3" x 20                 slr / s/l abd  20 ea  1# x20  3# x20                 Prone hs curls/ hip ext  20x each  1# x 20  3# x20                 Prone quad stretch w/strap 20 sec x 5  20 sec x 5  20' x 5                 Laq/hip flexion X 20   1# x20  3# x 30                 Standing slrx 3 X 20  1# x 20  1#x 20                  X 20                      Standing hs curls  x 20  1# x 20  1# x 20                 Step ups f/ l  X 20 each  1# x20 each  1# x20 ea                 March on foam 1 min X 1 min  x1'                 slb on foam  X 5   x 5  x5                 Squats @ llbar    x 20   x20                  leg press   80# x 20  80# x 30  100#                stand on foam ball @ tramp   bball  x 30   bball x 30                                                                                                                                               Modalities  9                 Cp L knee 10 min  10 min  10'                                                                         Assessment: Tolerated treatment well  Patient exhibited good technique with therapeutic exercises  Patient requested increased weights for strengthening today    Had some discomfort in knee with prone quad stretch, but symptoms resolved after stretch was finished  Plan: Progress treatment as tolerated

## 2018-02-16 ENCOUNTER — OFFICE VISIT (OUTPATIENT)
Dept: PHYSICAL THERAPY | Facility: CLINIC | Age: 33
End: 2018-02-16
Payer: OTHER MISCELLANEOUS

## 2018-02-16 ENCOUNTER — HOSPITAL ENCOUNTER (EMERGENCY)
Facility: HOSPITAL | Age: 33
Discharge: HOME | End: 2018-02-16
Attending: EMERGENCY MEDICINE | Admitting: EMERGENCY MEDICINE
Payer: COMMERCIAL

## 2018-02-16 ENCOUNTER — APPOINTMENT (EMERGENCY)
Dept: ULTRASOUND IMAGING | Facility: HOSPITAL | Age: 33
End: 2018-02-16
Payer: COMMERCIAL

## 2018-02-16 VITALS
SYSTOLIC BLOOD PRESSURE: 130 MMHG | BODY MASS INDEX: 31.41 KG/M2 | WEIGHT: 183 LBS | HEART RATE: 90 BPM | RESPIRATION RATE: 18 BRPM | DIASTOLIC BLOOD PRESSURE: 74 MMHG | OXYGEN SATURATION: 100 % | TEMPERATURE: 97.5 F

## 2018-02-16 DIAGNOSIS — Z47.89 ORTHOPEDIC AFTERCARE: Primary | ICD-10-CM

## 2018-02-16 DIAGNOSIS — N93.8 DYSFUNCTIONAL UTERINE BLEEDING: Primary | ICD-10-CM

## 2018-02-16 LAB
CLARITY, POC: CLEAR
COLOR, POC: YELLOW
EXT BILIRUBIN, UA: NEGATIVE
EXT BLOOD URINE: NEGATIVE
EXT GLUCOSE, UA: NEGATIVE
EXT KETONES: NEGATIVE
EXT NITRITE, UA: NEGATIVE
EXT PH, UA: 7.5
EXT PREG TEST URINE: NEGATIVE
EXT PROTEIN, UA: NEGATIVE
EXT SPECIFIC GRAVITY, UA: 1
EXT UROBILINOGEN: 0.2
WBC # BLD EST: NEGATIVE 10*3/UL

## 2018-02-16 PROCEDURE — 76830 TRANSVAGINAL US NON-OB: CPT

## 2018-02-16 PROCEDURE — 99284 EMERGENCY DEPT VISIT MOD MDM: CPT

## 2018-02-16 PROCEDURE — 81025 URINE PREGNANCY TEST: CPT | Performed by: EMERGENCY MEDICINE

## 2018-02-16 PROCEDURE — 76856 US EXAM PELVIC COMPLETE: CPT

## 2018-02-16 PROCEDURE — 97110 THERAPEUTIC EXERCISES: CPT | Performed by: PHYSICAL THERAPIST

## 2018-02-16 PROCEDURE — 81002 URINALYSIS NONAUTO W/O SCOPE: CPT | Performed by: EMERGENCY MEDICINE

## 2018-02-16 NOTE — ED PROVIDER NOTES
History  Chief Complaint   Patient presents with    Vaginal Bleeding     pt "started menstural cycle 2/10 and is still bleeding" has "a pain in the belly"      34 y/o female presents today c/o dysuria and blood 'when she wipes' for a week  States her menstrual period was over 2/10 and since then she has had a small amount of blood spotting when she wipes since then  Also reports some burning with urination  History provided by:  Patient   used: Yes ()    Vaginal Bleeding   Quality:  Lighter than menses  Severity:  Mild  Duration:  6 days  Timing: only when she wipes  Progression:  Unchanged  Chronicity:  New  Possible pregnancy: no    Context: after urination    Relieved by:  None tried  Worsened by:  Nothing  Ineffective treatments:  None tried  Associated symptoms: abdominal pain (mild diffuse) and dysuria    Associated symptoms: no back pain, no dizziness, no dyspareunia, no fatigue, no fever, no nausea and no vaginal discharge    Risk factors: no bleeding disorder, no hx of ectopic pregnancy, no hx of endometriosis, no new sexual partner, no ovarian cysts, no ovarian torsion, no STD and no STD exposure        None       Past Medical History:   Diagnosis Date    Medial meniscus tear     left knee       Past Surgical History:   Procedure Laterality Date    KNEE SURGERY      NO PAST SURGERIES      NC KNEE SCOPE,MED/LAT MENISECTOMY Left 12/4/2017    Procedure: KNEE ARTHROSCOPY; CHONDROPLASTY; SYNOVECTOMY;  Surgeon: Luis Finley MD;  Location: [facility];  Service: Orthopedics       Family History   Problem Relation Age of Onset    Diabetes Father      I have reviewed and agree with the history as documented      Social History   Substance Use Topics    Smoking status: Current Every Day Smoker     Types: Cigarettes    Smokeless tobacco: Never Used      Comment: 12 cigs/day    Alcohol use No        Review of Systems   Constitutional: Negative for diaphoresis, fatigue and fever  HENT: Negative for congestion  Eyes: Negative for photophobia  Respiratory: Negative for cough and shortness of breath  Gastrointestinal: Positive for abdominal pain (mild diffuse)  Negative for nausea  Genitourinary: Positive for dysuria and vaginal bleeding  Negative for difficulty urinating, dyspareunia and vaginal discharge  Musculoskeletal: Negative for back pain  Skin: Negative for pallor, rash and wound  Neurological: Negative for dizziness and headaches  Psychiatric/Behavioral: Negative for confusion  Physical Exam  ED Triage Vitals [02/16/18 1421]   Temperature Pulse Respirations Blood Pressure SpO2   97 5 °F (36 4 °C) 88 20 125/76 100 %      Temp Source Heart Rate Source Patient Position - Orthostatic VS BP Location FiO2 (%)   Oral Monitor Sitting Left arm --      Pain Score       --           Orthostatic Vital Signs  Vitals:    02/16/18 1421 02/16/18 1747   BP: 125/76 130/74   Pulse: 88 90   Patient Position - Orthostatic VS: Sitting        Physical Exam   Constitutional: She is oriented to person, place, and time  She appears well-developed and well-nourished  HENT:   Head: Normocephalic and atraumatic  Eyes: Pupils are equal, round, and reactive to light  Neck: Normal range of motion  Neck supple  Cardiovascular: Normal rate and regular rhythm  Pulmonary/Chest: Effort normal and breath sounds normal    Abdominal: Soft  Bowel sounds are normal  There is tenderness (mild) in the suprapubic area  Musculoskeletal: Normal range of motion  Neurological: She is alert and oriented to person, place, and time  Skin: Skin is warm and dry  Psychiatric: She has a normal mood and affect  Nursing note and vitals reviewed        ED Medications  Medications - No data to display    Diagnostic Studies  Results Reviewed     Procedure Component Value Units Date/Time    POCT urinalysis dipstick [[id number]]  (Normal) Resulted:  02/16/18 1533    Lab Status:  Final result Specimen:  Urine Updated:  02/16/18 1533     Color, UA yellow     Clarity, UA clear     EXT Glucose, UA negative     EXT Bilirubin, UA (Ref: Negative) negative     EXT Ketones, UA (Ref: Negative) negative     EXT Spec Grav, UA 1 005     EXT Blood, UA (Ref: Negative) negative     EXT pH, UA 7 5     EXT Protein, UA (Ref: Negative) negative     EXT Urobilinogen, UA (Ref: 0 2- 1 0) 0 2     EXT Leukocytes, UA (Ref: Negative) negative     EXT Nitrite, UA (Ref: Negative) negative    POCT pregnancy, urine [[id number]]  (Normal) Resulted:  02/16/18 1533    Lab Status:  Final result Updated:  02/16/18 1533     EXT PREG TEST UR (Ref: Negative) negative                 US pelvis complete w transvaginal   Final Result by Anna Bowen MD (02/16 1807)       Normal pelvic ultrasound  Workstation performed: [id number]                    Procedures  Procedures       Phone Contacts  ED Phone Contact    ED Course  ED Course                                MDM  Number of Diagnoses or Management Options  Dysfunctional uterine bleeding: new and requires workup  Diagnosis management comments: 9:29 PM  Late entry - US negative for acute pathology  Likely dysfunctional uterine bleeding  Recommend f/u with ob/gyn as outpatient           Amount and/or Complexity of Data Reviewed  Clinical lab tests: ordered and reviewed  Tests in the radiology section of CPT®: ordered and reviewed  Tests in the medicine section of CPT®: ordered and reviewed  Decide to obtain previous medical records or to obtain history from someone other than the patient: yes  Independent visualization of images, tracings, or specimens: yes    Risk of Complications, Morbidity, and/or Mortality  Presenting problems: high  Diagnostic procedures: high  Management options: moderate    Patient Progress  Patient progress: stable    CritCare Time    Disposition  Final diagnoses:   Dysfunctional uterine bleeding     Time reflects when diagnosis was documented in both MDM as applicable and the Disposition within this note     Time User Action Codes Description Comment    2/16/2018  6:14 PM Roosevelt Sandydamien Add [N93 8] Dysfunctional uterine bleeding       ED Disposition     ED Disposition Condition Comment    Discharge  [address] A discharge to home/self care  Condition at discharge: Stable        Follow-up Information     Follow up With Specialties Details Why Contact Info Additional [address] Obstetrics and Gynecology Schedule an appointment as soon as possible for a visit  [address]  [address] Emergency Department Emergency Medicine  If symptoms worsen [address] [facility]  [phone] [place] ED, [address] [place], [place], [facility]        There are no discharge medications for this patient  No discharge procedures on file      ED Provider  Electronically Signed by           Mamadou Sweet DO  02/16/18 5463

## 2018-02-16 NOTE — PROGRESS NOTES
Daily Note     Today's date: 2018  Patient name: Adry Jones  : 1985  MRN: [id number]  Referring provider: Kamran Cruz MD  Dx:   Encounter Diagnosis   Name Primary?  Orthopedic aftercare Yes                  Subjective: Patient continues to report no pain  Objective: See treatment diary below                 Left knee AAROM flexion/ ext  5 min                                                                                                                            Exercise Diary  /16               Recumbent bike 10 min  10 min  10'  10'               Qs, add sets  3 sec x 20 each 3sec x 20 3" x 20  5" x 20               slr / s/l abd  20 ea  1# x20  3# x20  3# x20               Prone hs curls/ hip ext  20x each  1# x 20  3# x20  3# x20               Prone quad stretch w/strap 20 sec x 5  20 sec x 5  20' x 5  20' x5               Laq/hip flexion X 20   1# x20  3# x 30  3# x30               Standing slrx 3 X 20  1# x 20  1#x 20 1 5# x20                X 20                      Standing hs curls  x 20  1# x 20  1# x 20  1 5# x20               Step ups f/ l  X 20 each  1# x20 each  1# x20 ea  1 5# x20 ea               March on foam 1 min X 1 min  x1'  x1'               slb on foam  X 5   x 5  x5  x5               Squats @ llbar    x 20   x20  x20                leg press   80# x 20  80# x 30  100# x20                stand on foam ball @ tramp   bball  x 30   bball x 30  bball x30                                                                                                                                             Modalities    2/                 Cp L knee 10 min  10 min  10'  pt deffered                                                                       Assessment: Patient was progress with program overall with good tolerance  Patient performed exercises with good form  Patient continues to present with strength deficits        Plan: Cont poc

## 2018-02-16 NOTE — DISCHARGE INSTRUCTIONS
Dysfunctional Uterine Bleeding   WHAT YOU NEED TO KNOW:   Dysfunctional uterine bleeding (DUB) is abnormal uterine bleeding that is caused by a problem with your hormones  You may have bleeding from your uterus at times other than your normal monthly period  Your monthly periods may last longer or shorter, and bleeding may be heavier or lighter than usual    DISCHARGE INSTRUCTIONS:   Medicines:   · Hormones  help decrease bleeding by making your monthly periods more regular  Sometimes this medicine may be given as birth control pills  · NSAIDs  help decrease swelling, pain, and fever  This medicine is available with or without a doctor's order  NSAIDs can cause stomach bleeding or kidney problems in certain people  If you take blood thinner medicine, always ask your healthcare provider if NSAIDs are safe for you  Always read the medicine label and follow directions  · Iron supplements  may be given if your blood iron level decreases because of heavy bleeding  Iron may make you constipated  Ask your healthcare provider for ways to prevent or treat constipation  Iron may also make your bowel movements turn dark or black  · Take your medicine as directed  Contact your healthcare provider if you think your medicine is not helping or if you have side effects  Tell him or her if you are allergic to any medicine  Keep a list of the medicines, vitamins, and herbs you take  Include the amounts, and when and why you take them  Bring the list or the pill bottles to follow-up visits  Carry your medicine list with you in case of an emergency  Follow up with your healthcare provider as directed:  Write down your questions so you remember to ask them during your visits  Self-care:   · Apply heat  on your lower abdomen for 20 to 30 minutes every 2 hours for as many days as directed  Heat helps decrease pain and muscle spasms  · Include foods high in iron  if needed   Examples of foods high in iron are leafy green vegetables, beef, pork, liver, eggs, and whole-grain breads and cereals  · Keep a diary of your menstrual cycles  Keep track of the number of tampons or pads you use each day  · Talk to your healthcare provider before you start a weight loss program   You may need to wait until the abnormal bleeding has stopped before you try to lose weight  The amount of iron in your blood should be normal before you lose weight  Contact your healthcare provider if:   · You need to change your sanitary pad or tampon more than once an hour  · Your medicine causes nausea, vomiting, or diarrhea  · You have questions or concerns about your condition or care  Return to the emergency department if:   · You continue to bleed heavily, or you feel faint  © 2017 2600   Information is for End User's use only and may not be sold, redistributed or otherwise used for commercial purposes  All illustrations and images included in CareNotes® are the copyrighted property of [employer]  or [employer]am [employer]  The above information is an  only  It is not intended as medical advice for individual conditions or treatments  Talk to your doctor, nurse or pharmacist before following any medical regimen to see if it is safe and effective for you

## 2018-02-21 ENCOUNTER — OFFICE VISIT (OUTPATIENT)
Dept: PHYSICAL THERAPY | Facility: CLINIC | Age: 33
End: 2018-02-21
Payer: OTHER MISCELLANEOUS

## 2018-02-21 DIAGNOSIS — Z47.89 ORTHOPEDIC AFTERCARE: Primary | ICD-10-CM

## 2018-02-21 PROCEDURE — 97110 THERAPEUTIC EXERCISES: CPT | Performed by: PHYSICAL THERAPIST

## 2018-02-21 NOTE — PROGRESS NOTES
Daily Note     Today's date: 2018  Patient name: Janelle Guzman  : 1985  MRN: [id number]  Referring provider: Cami Ricks MD  Dx:   Encounter Diagnosis     ICD-10-CM    1  Orthopedic aftercare Z47 [id number]                   Subjective: patient presents with reports of no pain  She does report mild soreness with full squats at llbar  Reports after this activity - she again has no pain         Objective: See treatment diary below                            Left knee AAROM flexion/ ext  5 min                                                                                                                            Exercise Diary               Recumbent bike 10 min  10 min  10'  10'  10'             Qs, add sets  3 sec x 20 each 3sec x 20 3" x 20  5" x 20 "05x20              slr / s/l abd  20 ea  1# x20  3# x20  3# x20 3#x 20             Prone hs curls/ hip ext  20x each  1# x 20  3# x20  3# x20 3#x 20             Prone quad stretch w/strap 20 sec x 5  20 sec x 5  20' x 5  20' x5  np             Laq/hip flexion X 20   1# x20  3# x 30  3# x30 3#x 20              Standing slrx 3 X 20  1# x 20  1#x 20 1 5# x20 On foamx20               X 20                      Standing hs curls  x 20  1# x 20  1# x 20  1 5# x20 On foamx20             Step ups f/ l  X 20 each  1# x20 each  1# x20 ea  1 5# x20 ea 2 5#x20             March on foam 1 min X 1 min  x1'  x1'  1'             slb on foam  X 5   x 5  x5  x5  x5             Squats @ llbar    x 20   x20  x20  x20              leg press   80# x 20  80# x 30  100# x20 100# x30               stand on foam ball @ tramp   bball  x 30   bball x 30  bball x30 [id number]              eliptical          5'                                                                                                                   Modalities                   Cp L knee 10 min  10 min  10'  pt deffered                                                                   Assessment: Tolerated treatment well  Patient exhibited good technique with therapeutic exercises and would benefit from continued PT  Patient to MD next Wednesday - reassess on 2-28-18 in AM prior to MD visit  Plan: Continue per plan of care  Progress treatment as tolerated

## 2018-02-28 ENCOUNTER — OFFICE VISIT (OUTPATIENT)
Dept: OBGYN CLINIC | Facility: MEDICAL CENTER | Age: 33
End: 2018-02-28
Payer: OTHER MISCELLANEOUS

## 2018-02-28 ENCOUNTER — OFFICE VISIT (OUTPATIENT)
Dept: PHYSICAL THERAPY | Facility: CLINIC | Age: 33
End: 2018-02-28
Payer: OTHER MISCELLANEOUS

## 2018-02-28 VITALS
BODY MASS INDEX: 30.93 KG/M2 | DIASTOLIC BLOOD PRESSURE: 91 MMHG | SYSTOLIC BLOOD PRESSURE: 135 MMHG | HEART RATE: 96 BPM | RESPIRATION RATE: 20 BRPM | WEIGHT: 180.2 LBS

## 2018-02-28 DIAGNOSIS — Z47.89 ORTHOPEDIC AFTERCARE: Primary | ICD-10-CM

## 2018-02-28 DIAGNOSIS — S83.412D SPRAIN OF MEDIAL COLLATERAL LIGAMENT OF LEFT KNEE, SUBSEQUENT ENCOUNTER: Primary | ICD-10-CM

## 2018-02-28 PROCEDURE — G8991 OTHER PT/OT GOAL STATUS: HCPCS | Performed by: PHYSICAL THERAPIST

## 2018-02-28 PROCEDURE — 99024 POSTOP FOLLOW-UP VISIT: CPT | Performed by: ORTHOPAEDIC SURGERY

## 2018-02-28 PROCEDURE — 97110 THERAPEUTIC EXERCISES: CPT

## 2018-02-28 PROCEDURE — G8990 OTHER PT/OT CURRENT STATUS: HCPCS | Performed by: PHYSICAL THERAPIST

## 2018-02-28 NOTE — LETTER
February 28, 2018     Patient: Selene Rae   YOB: 1985   Date of Visit: 2/28/2018       To Whom it May Concern:    Selene Rae is under my professional care  She was seen in my office on 2/28/2018  She may return to work on March 5, 2018  If you have any questions or concerns, please don't hesitate to call           Sincerely,          Chuyita Hanson MD        CC: Selene Kurtzs

## 2018-02-28 NOTE — PROGRESS NOTES
PT DISCHARGE    Today's date: 2018  Patient name: Niranjan Gupta  : 1985  MRN: [id number]  Referring provider: Radha Dorado MD  Dx:   Encounter Diagnosis     ICD-10-CM    1  Orthopedic aftercare Z47 89        Start Time: 945  Stop Time:   Total time in clinic (min): 51 minutes    Assessment  Impairments: abnormal or restricted ROM, activity intolerance, impaired physical strength, lacks appropriate home exercise program and pain with function    Assessment details: Patient was provided a home exercise program and demonstrated an understanding of exercises  Patient was advised to stop performing home exercise program if symptoms increase or new complaints developed  Verbal understanding demonstrated regarding home exercise program instructions  It appears that patient has benefited maximally from PT at this time  Understanding of Dx/Px/POC: good   Prognosis: good    Goals  STG  1  Decrease pain by at least two subjective ratings in 4 weeks  MET  2  Increase ROM by at least 5 degrees in 4 weeks  MET  LTG  1  Independent with HEP  MET  2  Return to prior functional level - MET    Plan  Planned therapy interventions: flexibility, graded exercise, home exercise program, therapeutic exercise, strengthening, stretching, patient education, neuromuscular re-education, manual therapy, joint mobilization and IADL retraining  Treatment plan discussed with: patient  Plan details: Recommendation is to discharge skilled PT and have patient continue with HEP that she is competent with           Subjective Evaluation    Pain  Current pain ratin  At best pain ratin  At worst pain ratin  Location: left knee  Quality: dull ache  Relieving factors: ice and change in position  Progression: improved          Objective     Active Range of Motion   Left Knee   Flexion: 125 degrees   Extension: 0 degrees     Right Knee   Normal active range of motion    Additional Active Range of Motion Details  Mild tightness end range persists with both A/ AAROM left knee          Strength/Myotome Testing     Left Knee   Normal strength    Right Knee   Normal strength          Precautions: s/p left knee arthroscopic sx    Daily Treatment Diary     See daily note below

## 2018-03-01 NOTE — PROGRESS NOTES
[address] [place] [address] 35 y o  female MRN: [id number]  Unit/Bed#: [unfilled]      Chief Complaint:   left knee pain    HPI:   35 y  o female complaining of left knee pain  Status post left knee arthroscopy partial medial meniscectomy and associated procedures  Patient states she is feeling much better  She did have some pain in the medial aspect  Denies any numbness tingling fevers chills    Review Of Systems:   · Skin: Normal  · Neuro: See HPI  · Musculoskeletal: See HPI  · 14 point review of systems negative except as stated above     Past Medical History:   Past Medical History:   Diagnosis Date    Medial meniscus tear     left knee       Past Surgical History:   Past Surgical History:   Procedure Laterality Date    KNEE SURGERY      NO PAST SURGERIES      [place] KNEE SCOPE,MED/LAT MENISECTOMY Left 12/4/2017    Procedure: KNEE ARTHROSCOPY; CHONDROPLASTY; SYNOVECTOMY;  Surgeon: Tyler Bear MD;  [facility];  Service: Orthopedics       Family History:  Family history reviewed and non-contributory  Family History   Problem Relation Age of Onset    Diabetes Father        Social History:  Social History     Social History    Marital status: Single     Spouse name: N/A    Number of children: N/A    Years of education: N/A     Social History Main Topics    Smoking status: Current Every Day Smoker     Types: Cigarettes    Smokeless tobacco: Never Used      Comment: 12 cigs/day    Alcohol use No    Drug use: No    Sexual activity: Not Asked     Other Topics Concern    None     Social History Narrative    None       Allergies: Allergies   Allergen Reactions    Tramadol Itching           Labs:  No results found for: HCT, HGB, PT, INR, WBC, ESR, CRP    Meds:  No current outpatient prescriptions on file      Blood Culture:   No results found for: BLOODCX    Wound Culture:   No results found for: WOUNDCULT    Ins and Outs:  [unfilled]          Physical Exam:   /91 Pulse 96   Resp 20   Wt 81 7 kg (180 lb 3 2 oz)   LMP 02/16/2018   BMI 30 93 kg/m²   Gen: Alert and oriented to person, place, time  HEENT: EOMI, eyes clear, moist mucus membranes, hearing intact  Respiratory: Bilateral chest rise  No audible wheezing found  Cardiovascular: Regular Rate and Rhythm  Abdomen: soft nontender/nondistended  Musculoskeletal: left lower extremity  · Skin intact  · No tenderness palpation over the medial lateral joint lines  · Can perform striaght leg raise  · Stable to varus/valgus stress  · Negative lachmans, Posterior draw, Quintin's test  · Sensation intact L1-S1  · 5/5 strength to hip flexion/extension, knee flexion/extension ankle dorsi/plantar flexion, EHL/FHL      Radiology:       _*_*_*_*_*_*_*_*_*_*_*_*_*_*_*_*_*_*_*_*_*_*_*_*_*_*_*_*_*_*_*_*_*_*_*_*_*_*_*_*_*    Assessment:  35 y  o female with left knee pain which is resolved  Pain likely secondary to MCL strain brain status post left knee arthroscopy partial medial meniscectomy and associated procedures    Plan:   · Weight bearing as tolerated  left lower extremity  · Pain control  · Continue exercises  · Follow-up p r n  · Discussed treatment plan with patient and she is in agreement treatment plan   Thank you    Rose Bojorquez MD

## 2018-06-10 ENCOUNTER — APPOINTMENT (OUTPATIENT)
Dept: RADIOLOGY | Facility: MEDICAL CENTER | Age: 33
End: 2018-06-10
Payer: OTHER MISCELLANEOUS

## 2018-06-10 ENCOUNTER — APPOINTMENT (OUTPATIENT)
Dept: URGENT CARE | Facility: MEDICAL CENTER | Age: 33
End: 2018-06-10
Payer: OTHER MISCELLANEOUS

## 2018-06-10 DIAGNOSIS — M25.562 ACUTE PAIN OF LEFT KNEE: ICD-10-CM

## 2018-06-10 DIAGNOSIS — M25.562 ACUTE PAIN OF LEFT KNEE: Primary | ICD-10-CM

## 2018-06-10 PROCEDURE — 73564 X-RAY EXAM KNEE 4 OR MORE: CPT

## 2018-06-13 ENCOUNTER — APPOINTMENT (OUTPATIENT)
Dept: URGENT CARE | Facility: MEDICAL CENTER | Age: 33
End: 2018-06-13
Payer: OTHER MISCELLANEOUS

## 2018-06-13 PROCEDURE — 99213 OFFICE O/P EST LOW 20 MIN: CPT | Performed by: PREVENTIVE MEDICINE

## 2019-01-16 NOTE — TELEPHONE ENCOUNTER
Patient sees Dr Danielle Guthrie     Work comp is calling to get more information about the patient's visit yesterday  Fatmata Silva would like clarification about her release to return to work   Fatmata Silva is stating that she needs more specific details about work duty work restrictions, weight bearing status, etc  Scheduled For Follow Up In (Optional): 6 months Instructions (Optional): FBSE Detail Level: Simple

## 2022-07-14 ENCOUNTER — HOSPITAL ENCOUNTER (EMERGENCY)
Facility: HOSPITAL | Age: 37
Discharge: HOME | End: 2022-07-14
Attending: EMERGENCY MEDICINE | Admitting: EMERGENCY MEDICINE
Payer: COMMERCIAL

## 2022-07-14 VITALS
OXYGEN SATURATION: 99 % | RESPIRATION RATE: 18 BRPM | DIASTOLIC BLOOD PRESSURE: 74 MMHG | SYSTOLIC BLOOD PRESSURE: 121 MMHG | TEMPERATURE: 98.4 F | HEART RATE: 74 BPM

## 2022-07-14 DIAGNOSIS — R44.0 AUDITORY HALLUCINATIONS: Primary | ICD-10-CM

## 2022-07-14 DIAGNOSIS — F32.A DEPRESSION: ICD-10-CM

## 2022-07-14 LAB
AMPHETAMINES SERPL QL SCN: NEGATIVE
BARBITURATES UR QL: POSITIVE
BENZODIAZ UR QL: NEGATIVE
COCAINE UR QL: NEGATIVE
ETHANOL EXG-MCNC: 0 MG/DL
EXT PREG TEST URINE: NEGATIVE
EXT. CONTROL ED NAV: NORMAL
METHADONE UR QL: NEGATIVE
OPIATES UR QL SCN: NEGATIVE
OXYCODONE+OXYMORPHONE UR QL SCN: NEGATIVE
PCP UR QL: NEGATIVE
THC UR QL: NEGATIVE

## 2022-07-14 PROCEDURE — 82075 ASSAY OF BREATH ETHANOL: CPT | Performed by: EMERGENCY MEDICINE

## 2022-07-14 PROCEDURE — 99285 EMERGENCY DEPT VISIT HI MDM: CPT | Performed by: EMERGENCY MEDICINE

## 2022-07-14 PROCEDURE — 80307 DRUG TEST PRSMV CHEM ANLYZR: CPT | Performed by: EMERGENCY MEDICINE

## 2022-07-14 PROCEDURE — 81025 URINE PREGNANCY TEST: CPT | Performed by: EMERGENCY MEDICINE

## 2022-07-14 PROCEDURE — 99284 EMERGENCY DEPT VISIT MOD MDM: CPT

## 2022-07-14 PROCEDURE — G0425 INPT/ED TELECONSULT30: HCPCS | Performed by: PSYCHIATRY & NEUROLOGY

## 2022-07-14 RX ORDER — OLANZAPINE 10 MG/1
10 TABLET ORAL
Qty: 30 TABLET | Refills: 0 | Status: SHIPPED | OUTPATIENT
Start: 2022-07-14

## 2022-07-14 RX ORDER — ESCITALOPRAM OXALATE 10 MG/1
10 TABLET ORAL DAILY
Qty: 30 TABLET | Refills: 0 | Status: SHIPPED | OUTPATIENT
Start: 2022-07-14

## 2022-07-14 RX ORDER — ESCITALOPRAM OXALATE 10 MG/1
10 TABLET ORAL ONCE
Status: COMPLETED | OUTPATIENT
Start: 2022-07-14 | End: 2022-07-14

## 2022-07-14 RX ORDER — MIRTAZAPINE 15 MG/1
15 TABLET, FILM COATED ORAL
Qty: 30 TABLET | Refills: 0 | Status: SHIPPED | OUTPATIENT
Start: 2022-07-14

## 2022-07-14 RX ORDER — OLANZAPINE 10 MG/1
10 TABLET ORAL ONCE
Status: COMPLETED | OUTPATIENT
Start: 2022-07-14 | End: 2022-07-14

## 2022-07-14 RX ADMIN — OLANZAPINE 10 MG: 10 TABLET, FILM COATED ORAL at 21:47

## 2022-07-14 RX ADMIN — ESCITALOPRAM OXALATE 10 MG: 10 TABLET ORAL at 21:47

## 2022-07-14 NOTE — ED NOTES
Crisis contacted RG and placed psychiatric consult/medication consult requested by the attending doctor

## 2022-07-14 NOTE — ED NOTES
Crisis arrive at emergency department due to auditory hallucinations  Pt appears calm and cooperative  Assessment was completed with the  assistance as the pt does not understand english  Pt oriented x4  Pt currently working full time at [employer]  Pt living with her supportive , and 2 children (9&16)  Pt reports experiencing auditory hallucinations for the past 2 years  Since last Saturday pt states "the voice" wont stop  Pt states she hears one voice that telling her "you are not worth living"  Pt denies that voice is telling her to kill self  Pt states voice is repeating same sentence over and over again  Pt attempted to distract self, or to play loud music but the voice continues  No significant events reported since and/or prior to Saturday  Pt states she lost her dear brother, who she was close to in 2015  Pt's brother was murdered  Pt reports mental health services therapy and medication management around the year of 2007 due to depression  At that time pt has some stressful social events  Pt denies sexual, physical or verbal abuse  Pt denies suicidal thoughts, intentions or plans  Pt denies homicidal thoughts, intentions or plans  Pt denies visual hallucinations  Pt denies self harm  Pt interested in restarting mental health services therapy and medication management that would stop "the voice" and help her to sleep better, eat better and focus better  Pt states he family and her children very important to her and she wants to get better  Pt denies the need for inpatient hospitalizations  Pt able to contact for safety  Crisis reached out to attending doctor and psychiatric consult/medication consult was ordered  Pt in agreement of the plan

## 2022-07-15 NOTE — ED NOTES
[address]    Name and Date of Birth:  Maria Eugenia Pavon y o  1985    Date of Referral: July 14, 2022    Presenting Symptoms and Stressors:      Symptoms:  depression, insomnia and auditory hallucinations  Stressors:  Death of brother in 2015; recent move to [place]    Access to Weapons:  No    Smoking Status: None    Substance Use:  None    Suicidal Ideation: None    Homicidal Ideation: None    Depressed Mood: Yes    Hanny/Hypomania: None    Psychosis: AH saying "You are not worth living "    Agitation: None    Appetite Changes: Fair    Sleep Disturbance: Difficulty sleeping    Diagnoses:  1   MDD, Severe, Single Episode with Psychotic Features    Current Psychiatrist or Therapist:    Psychiatrist: None  Therapist: None    Do they Require Ambulatory Assistance: No    Communication Assistance: Yes - pt is Amharic speaking only    Legal Issues: [address] [facility], [facility], [address]  7/14/22 21:43

## 2022-07-15 NOTE — ED NOTES
CW provided out-pt resources to pt with the assistance of a Lithuanian-speaking PA who explained the nature of the resources to pt  CW also sent a referral for Partial via Epic on behalf of pt, which the PA also clarified for pt in [facility] [place]      Alonso Oneill [facility], [address]  07/14/22 21:53

## 2022-07-15 NOTE — DISCHARGE INSTRUCTIONS
Start taking the medications as advised by the psychiatrist   Follow up closely for further management of your symptoms  Return if you start having thoughts of wanting to harm yourself or concerned you will go through with it, or for any other concerns

## 2022-07-15 NOTE — ED NOTES
Pt seen by Dr Dru Betancur via IPAD for psychiatric consult  Second tablet used for a , as pt is Syriac-speaking only      [facility] facilitated the above,    Hallie Lynne CW  07/14/22 20:29

## 2022-07-15 NOTE — ED PROVIDER NOTES
History  Chief Complaint   Patient presents with    Psychiatric Evaluation     Pt states she is hearing voices in her head stating she should kill herself  No mental health hx  Pt denies any SI/HI        used: Yes ([employer] #[id number] for history, #[id number] for discharge)    Psychiatric Evaluation      None       Past Medical History:   Diagnosis Date    Medial meniscus tear     left knee       Past Surgical History:   Procedure Laterality Date    KNEE SURGERY      NO PAST SURGERIES      [place] KNEE SCOPE,MED/LAT MENISECTOMY Left 12/4/2017    Procedure: KNEE ARTHROSCOPY; CHONDROPLASTY; SYNOVECTOMY;  Surgeon: Nickie Ohara MD;  [facility];  Service: Orthopedics       Family History   Problem Relation Age of Onset    Diabetes Father      I have reviewed and agree with the history as documented  E-Cigarette/Vaping     E-Cigarette/Vaping Substances     Social History     Tobacco Use    Smoking status: Current Every Day Smoker     Types: Cigarettes    Smokeless tobacco: Never Used    Tobacco comment: 12 cigs/day   Substance Use Topics    Alcohol use: No    Drug use: No       Review of Systems    Physical Exam  Physical Exam  Vitals and nursing note reviewed  Constitutional:       General: She is not in acute distress  Appearance: She is well-developed  HENT:      Head: Normocephalic and atraumatic  Eyes:      Conjunctiva/sclera: Conjunctivae normal       Pupils: Pupils are equal, round, and reactive to light  Neck:      Trachea: No tracheal deviation  Cardiovascular:      Rate and Rhythm: Normal rate and regular rhythm  Heart sounds: Normal heart sounds  Pulmonary:      Effort: Pulmonary effort is normal  No respiratory distress  Breath sounds: Normal breath sounds  Abdominal:      General: There is no distension  Palpations: Abdomen is soft  Tenderness: There is no abdominal tenderness     Musculoskeletal:      Cervical back: Normal range of motion  Skin:     General: Skin is warm and dry  Neurological:      Mental Status: She is alert and oriented to person, place, and time  GCS: GCS eye subscore is 4  GCS verbal subscore is 5  GCS motor subscore is 6  Psychiatric:         Mood and Affect: Affect is tearful  Behavior: Behavior normal          Thought Content: Thought content does not include homicidal or suicidal ideation  Comments: Soft spoken  Not responding to external stimuli         Vital Signs  ED Triage Vitals [07/14/22 1340]   Temperature Pulse Respirations Blood Pressure SpO2   98 4 °F (36 9 °C) (!) 110 16 161/93 100 %      Temp Source Heart Rate Source Patient Position - Orthostatic VS BP Location FiO2 (%)   Oral Monitor Sitting Left arm --      Pain Score       --           Vitals:    07/14/22 1340 07/14/22 1506 07/14/22 1656 07/14/22 1700   BP: 161/93 148/96 128/83 128/83   Pulse: (!) 110 82 79 80   Patient Position - Orthostatic VS: Sitting Sitting Lying          Visual Acuity      ED Medications  Medications   OLANZapine (ZyPREXA) tablet 10 mg (has no administration in time range)   escitalopram (LEXAPRO) tablet 10 mg (has no administration in time range)       Diagnostic Studies  Results Reviewed     Procedure Component Value Units Date/Time    Rapid drug screen, urine [[id number]]  (Abnormal) Collected: 07/14/22 1610    Lab Status: Final result Specimen: Urine, Clean Catch Updated: 07/14/22 1646     Amph/Meth UR Negative     Barbiturate Ur Positive     Benzodiazepine Urine Negative     Cocaine Urine Negative     Methadone Urine Negative     Opiate Urine Negative     PCP Ur Negative     THC Urine Negative     Oxycodone Urine Negative    Narrative:      Presumptive report  If requested, specimen will be sent to reference lab for confirmation  FOR MEDICAL PURPOSES ONLY  IF CONFIRMATION NEEDED PLEASE CONTACT THE LAB WITHIN 5 DAYS      Drug Screen Cutoff Levels:  AMPHETAMINE/METHAMPHETAMINES  1000 ng/mL  BARBITURATES     200 ng/mL  BENZODIAZEPINES     200 ng/mL  COCAINE      300 ng/mL  METHADONE      300 ng/mL  OPIATES      300 ng/mL  PHENCYCLIDINE     25 ng/mL  THC       50 ng/mL  OXYCODONE      100 ng/mL    POCT pregnancy, urine [[id number]]  (Normal) Resulted: 07/14/22 1610    Lab Status: Final result Updated: 07/14/22 1613     EXT PREG TEST UR (Ref: Negative) negative     Control valid    POCT alcohol breath test [[id number]]  (Normal) Resulted: 07/14/22 1536    Lab Status: Final result Updated: 07/14/22 1536     EXTBreath Alcohol 0 0000                 No orders to display              Procedures  Procedures         ED Course                                             MDM  Number of Diagnoses or Management Options  Auditory hallucinations: new and requires workup  Depression: new and requires workup  Diagnosis management comments: This is a 27-year-old female who presents here today with auditory hallucinations  She says she has her them about two years, usually just mild in background noise but when they get louder is usually for a brief period of time before improving  She says it is an unknown male voice that tells her that she should be dead  It does not tell her to harm herself  She denies any suicidal ideation or prior self-harm  She says since 7/9 the voice has been persistent, not easing off, and louder than normal   She has had some problems with depression due to the voice and says she is getting to the point that is frustrating her on off that she is afraid she might harm herself to stop the voice  She does not have a specific plan to harm herself and has not attempted to do so, and does not want to harm herself because she has two young children  She does endorse problems sleeping for which she has been taking over-the-counter Benadryl, recently up to six pills at a time in order to sleep at night  She denies any other drug or alcohol use  She denies underlying medical problems    She denies known family history of mental health disorders  Review of systems:  Otherwise negative unless stated as above    She is tearful appearing, in no acute distress  Exam is otherwise unremarkable  We will have her evaluated by crisis  She would likely benefit from inpatient admission, however does not meet criteria for 302 involuntary admission  She was seen by crisis, and is declining inpatient admission  Given severity of symptoms psychiatry consult was placed  He did recommend inpatient admission however agrees that there are no grounds for 302 admission  He does recommend starting medications to help with her symptoms  I discussed with her management of them at home, need for follow-up, and low threshold for return to the emergency department, and she expresses understanding with this plan  Amount and/or Complexity of Data Reviewed  Clinical lab tests: ordered and reviewed  Discuss the patient with other providers: yes        Disposition  Final diagnoses: Auditory hallucinations   Depression     Time reflects when diagnosis was documented in both MDM as applicable and the Disposition within this note     Time User Action Codes Description Comment    7/14/2022  3:53 PM Julio Hernandez Add [R44 0] Auditory hallucinations     7/14/2022  3:53 PM Julio Hernandez Add [F32  A] Depression       ED Disposition     ED Disposition   Discharge    Condition   Fair    Date/Time   Thu Jul 14, 2022  9:15 PM    Comment   [address]  discharge to home/self care           Follow-up Information     Follow up With Specialties Details Why Contact Info    Denia Mc MD Family Medicine Schedule an appointment as soon as possible for a visit  to follow up on your symptoms [facility] [address] [facility] [address]  [phone]      crisis resources  Schedule an appointment as soon as possible for a visit  to follow up on your symptoms           Patient's Medications Discharge Prescriptions    ESCITALOPRAM (LEXAPRO) 10 MG TABLET    Take 1 tablet (10 mg total) by mouth daily       Start Date: 7/14/2022 End Date: --       Order Dose: 10 mg       Quantity: 30 tablet    Refills: 0    MIRTAZAPINE (REMERON) 15 MG TABLET    Take 1 tablet (15 mg total) by mouth daily at bedtime       Start Date: 7/14/2022 End Date: --       Order Dose: 15 mg       Quantity: 30 tablet    Refills: 0    OLANZAPINE (ZYPREXA) 10 MG TABLET    Take 1 tablet (10 mg total) by mouth daily at bedtime       Start Date: 7/14/2022 End Date: --       Order Dose: 10 mg       Quantity: 30 tablet    Refills: 0       No discharge procedures on file      PDMP Review     None          ED Provider  Electronically Signed by           Ada Acuña MD  07/14/22 2901

## 2022-07-15 NOTE — TELEMEDICINE
TeleConsultation - Beau Marquez 1947 40 y o  female MRN: [id number]  Unit/Bed#: ED 14 Encounter: [id number]        REQUIRED DOCUMENTATION:     1  This service was provided via Telemedicine  2  Provider located at [facility]   3  TeleMed provider: Tameka Ibarra MD   4  Identify all parties in room with patient during tele consult:  pt  5  Patient was then informed that this was a Telemedicine visit and that the exam was being conducted confidentially over secure lines  My office door was closed  No one else was in the room  Patient acknowledged consent and understanding of privacy and security of the Telemedicine visit, and gave us permission to have the assistant stay in the room in order to assist with the history and to conduct the exam   I informed the patient that I have reviewed their record in Epic and presented the opportunity for them to ask any questions regarding the visit today  The patient agreed to participate  Assessment/Plan     Assessment:  Major depression severe single episode with psychotic features; rule out recurrent episode is    Plan:   Risks, benefits and possible side effects of Medications:   Risks, benefits, and possible side effects of medications explained to patient and patient verbalizes understanding  I recommended inpatient psychiatric treatment but the patient is opposed to this and does not meet involuntary admission criteria as she denies suicidal homicidal ideation  She is motivated for outpatient psychiatric follow-up however  This would ideally be in the partial hospitalization/intensive outpatient treatment spectrum but whenever the quickest way is to get her in to outpatient psychiatric treatment to include further psychiatric evaluation medication management and support through a psychotherapist/counselor would be the route to take  Recommend to consider starting Zyprexa 10 mg p o  q h s  for psychosis    Recommend starting Lexapro 10 mg p o  daily for depression  Recommend giving an initial dose of these medications before discharge from the Emergency Department  Recommend starting Remeron 15 mg p o  q h s  for complaint of severe insomnia and to serve as appetite stimulant  The patient is in agreement this plan  No suicide precautions are indicated this time  The patient should not drive until it is evident that she shows no impairment from these medications  Chief Complaint:  I hear voices tell me that I do not have to live    History of Present Illness     Reason for Consult / Principal Problem:  Depression with auditory hallucinations    Patient is a 40 y o  female who presented to the emergency department were crisis obtained documented the following information: Crisis arrive at emergency department due to auditory hallucinations  Pt appears calm and cooperative  Assessment was completed with the  assistance as the pt does not understand english  Pt oriented x4  Pt currently working full time at [employer]  Pt living with her supportive , and 2 children (9&16)  Pt reports experiencing auditory hallucinations for the past 2 years  Since last Saturday pt states "the voice" wont stop  Pt states she hears one voice that telling her "you are not worth living"  Pt denies that voice is telling her to kill self  Pt states voice is repeating same sentence over and over again  Pt attempted to distract self, or to play loud music but the voice continues  No significant events reported since and/or prior to Saturday  Pt states she lost her dear brother, who she was close to in 2015  Pt's brother was murdered  Pt reports mental health services therapy and medication management around the year of 2007 due to depression  At that time pt has some stressful social events  Pt denies sexual, physical or verbal abuse  Pt denies suicidal thoughts, intentions or plans   Pt denies homicidal thoughts, intentions or plans  Pt denies visual hallucinations  Pt denies self harm  Pt interested in restarting mental health services therapy and medication management that would stop "the voice" and help her to sleep better, eat better and focus better  Pt states he family and her children very important to her and she wants to get better  Pt denies the need for inpatient hospitalizations  Pt able to contact for safety  Crisis reached out to attending doctor and psychiatric consult/medication consult was ordered  Pt in agreement of the plan       The patient states these auditory hallucinations have intensified somewhat over last couple weeks as she has felt more depressed  Her sleep has been very disturbed so that she has been sleeping very little  She states she has lost her appetite  Energy level is been very low  It appears that she is experiencing some anhedonia  She reports she was treated for depression back in 2006 to 2007 or 2007 2008  She did not experience the hallucinations at that time however  She denies that she has had any suicidal or homicidal ideation  Past psychiatric history: As above     Social history:  The patient is  with 2 children states everything's very good at home  She reports no history of abuse or other emotional trauma  Family history:  Unremarkable     Substance use history:  Unremarkable     Mental status examination:  The patient is alert and well oriented in all spheres  Affect is constricted  She is cooperative with the evaluation made good eye contact  Speech is unremarkable  A [place]-speaking  was used for the evaluation  Thought process was logical linear  Thought content was reality based  Sensorium is clear  Associations tight  Memory was grossly intact in all spheres  She appears to be of average intelligence by her use vocabulary, general fund of knowledge, sentence structure and syntax  She denies any suicidal homicidal ideation    She admits to auditory hallucinations as above  She reports that she is very safe to be at home without any danger of harming herself or others  She is very motivated for outpatient psychiatric treatment  Insight and judgment are intact  Consult to Psychiatry  Consult performed by: Dimple Robert MD  Consult ordered by: Ada Acuña MD              Past Medical History:   Diagnosis Date    Medial meniscus tear     left knee       Medical Review Of Systems:  Review of Systems    Meds/Allergies   all current active meds have been reviewed  Allergies   Allergen Reactions    Tramadol Itching       Objective   Vital signs in last 24 hours:  Temp:  [98 4 °F (36 9 °C)] 98 4 °F (36 9 °C)  HR:  [] 80  Resp:  [16] 16  BP: (128-161)/(83-96) 128/83    No intake or output data in the 24 hours ending 07/14/22 2009      Lab Results:  Reviewed  Imaging Studies:  Reviewed  EKG, Pathology, and Other Studies:  Reviewed    Code Status: No Order  Advance Directive and Living Will:      Power of :    POLST:      Counseling / Coordination of Care  Total floor / unit time spent today 30 minutes  Greater than 50% of total time was spent with the patient and / or family counseling and / or coordination of care  A description of the counseling / coordination of care:  Chart review, patient evaluation, coordination and communication with staff, nursing and provider

## 2025-02-05 ENCOUNTER — TELEPHONE (OUTPATIENT)
Age: 40
End: 2025-02-05

## 2025-02-05 NOTE — TELEPHONE ENCOUNTER
Patient has been added to the Medication Management wait list without a referral.    Insurance: [employer]  Insurance Type:    Commercial []   Medicaid [x]   [facility] (if applicable)   Medicare []  Location Preference:   Provider Preference:   Virtual: Yes [] No []  Were outside resources sent: Yes [x] No []

## 2025-02-11 ENCOUNTER — OFFICE VISIT (OUTPATIENT)
Dept: URGENT CARE | Facility: CLINIC | Age: 40
End: 2025-02-11
Payer: COMMERCIAL

## 2025-02-11 VITALS
RESPIRATION RATE: 14 BRPM | DIASTOLIC BLOOD PRESSURE: 84 MMHG | WEIGHT: 180 LBS | SYSTOLIC BLOOD PRESSURE: 110 MMHG | HEART RATE: 107 BPM | OXYGEN SATURATION: 98 % | BODY MASS INDEX: 30.9 KG/M2 | TEMPERATURE: 96.9 F

## 2025-02-11 DIAGNOSIS — G43.909 MIGRAINE WITHOUT STATUS MIGRAINOSUS, NOT INTRACTABLE, UNSPECIFIED MIGRAINE TYPE: Primary | ICD-10-CM

## 2025-02-11 DIAGNOSIS — R11.2 NAUSEA AND VOMITING, UNSPECIFIED VOMITING TYPE: ICD-10-CM

## 2025-02-11 PROBLEM — F33.3 MDD (MAJOR DEPRESSIVE DISORDER), RECURRENT, SEVERE, WITH PSYCHOSIS (HCC): Status: ACTIVE | Noted: 2024-02-07

## 2025-02-11 PROCEDURE — 99214 OFFICE O/P EST MOD 30 MIN: CPT | Performed by: PHYSICIAN ASSISTANT

## 2025-02-11 PROCEDURE — 96372 THER/PROPH/DIAG INJ SC/IM: CPT | Performed by: PHYSICIAN ASSISTANT

## 2025-02-11 RX ORDER — PHENTERMINE HYDROCHLORIDE 37.5 MG/1
37.5 CAPSULE ORAL DAILY
COMMUNITY
Start: 2024-12-23

## 2025-02-11 RX ORDER — LACTULOSE 10 G/15ML
10 SOLUTION ORAL
COMMUNITY
Start: 2024-12-23

## 2025-02-11 RX ORDER — OLANZAPINE 20 MG/1
20 TABLET ORAL
COMMUNITY
Start: 2025-01-31

## 2025-02-11 RX ORDER — NYSTATIN 100000 U/G
CREAM TOPICAL 2 TIMES DAILY
COMMUNITY
Start: 2024-09-24

## 2025-02-11 RX ORDER — TRIAMCINOLONE ACETONIDE 1 MG/G
CREAM TOPICAL 2 TIMES DAILY
COMMUNITY
Start: 2024-09-24

## 2025-02-11 RX ORDER — DIPHENHYDRAMINE HCL 25 MG
1 CAPSULE ORAL EVERY 6 HOURS PRN
COMMUNITY

## 2025-02-11 RX ORDER — KETOROLAC TROMETHAMINE 30 MG/ML
30 INJECTION, SOLUTION INTRAMUSCULAR; INTRAVENOUS ONCE
Status: COMPLETED | OUTPATIENT
Start: 2025-02-11 | End: 2025-02-11

## 2025-02-11 RX ORDER — ONDANSETRON 4 MG/1
4 TABLET, ORALLY DISINTEGRATING ORAL ONCE
Status: COMPLETED | OUTPATIENT
Start: 2025-02-11 | End: 2025-02-11

## 2025-02-11 RX ADMIN — ONDANSETRON 4 MG: 4 TABLET, ORALLY DISINTEGRATING ORAL at 11:49

## 2025-02-11 RX ADMIN — KETOROLAC TROMETHAMINE 30 MG: 30 INJECTION, SOLUTION INTRAMUSCULAR; INTRAVENOUS at 11:49

## 2025-02-11 NOTE — PROGRESS NOTES
[facility]        NAME: Karen Rehman is a 40 y.o. female  : 1985    MRN: [id number]  DATE: 2025  TIME: 11:55 AM      Assessment and Plan     Migraine without status migrainosus, not intractable, unspecified migraine type [G43.909]  1. Migraine without status migrainosus, not intractable, unspecified migraine type  ketorolac (TORADOL) injection 30 mg      2. Nausea and vomiting, unspecified vomiting type  ondansetron (ZOFRAN-ODT) dispersible tablet 4 mg        Medical Decision Making Note:   Likely migraine - gave toradol and zofran in office for headache and vomiting   Patient to take OTC medications as needed for symptoms   Go to the ER with any worsening or severe symptoms     Patient Instructions     Patient Instructions   No tome ibuprofeno de venta vishal dami 8-12 horas desde el momento de la inyección recibida hoy    [place] ibuprofeno o paracetamol de venta vishal jerald se indica en el empaque para los bernadette de leeanne    Acuda a la pilo de emergencias ante cualquier síntoma grave o persistente         Follow up with primary care provider.   Go to ER if symptoms worsen.    Chief Complaint     Chief Complaint   Patient presents with    Migraine     Started 2 days ago, with vomiting that started today         History of Present Illness     Patient presents with a migraine x 2 days and vomiting x today. She states she is a little dizzy. She denies balance issues, slurred speech, and weakness. She has not taken anything OTC for symptoms.         Review of Systems     Review of Systems   Constitutional:  Negative for chills, fatigue and fever.   HENT:  Negative for congestion, ear pain, postnasal drip, rhinorrhea, sinus pressure, sinus pain and sore throat.    Eyes:  Negative for pain and visual disturbance.   Respiratory:  Negative for cough, chest tightness and shortness of breath.    Cardiovascular:  Negative for chest pain and palpitations.   Gastrointestinal:  Positive  for vomiting. Negative for abdominal pain, diarrhea and nausea.   Genitourinary:  Negative for dysuria and hematuria.   Musculoskeletal:  Negative for arthralgias, back pain and myalgias.   Skin:  Negative for rash.   Neurological:  Positive for headaches. Negative for dizziness, seizures, syncope and numbness.   All other systems reviewed and are negative.        Current Medications       Current Outpatient Medications:     baclofen 10 mg tablet, Take 1 tablet (10 mg total) by mouth 3 (three) times a day, Disp: 20 tablet, Rfl: 0    buPROPion (WELLBUTRIN XL) 300 mg 24 hr tablet, Take 300 mg by mouth daily, Disp: , Rfl:     diphenhydrAMINE (BENADRYL) 25 mg capsule, Take 1 capsule by mouth every 6 (six) hours as needed, Disp: , Rfl:     escitalopram (Lexapro) 10 mg tablet, Take 1 tablet (10 mg total) by mouth daily, Disp: 30 tablet, Rfl: 0    lactulose (CHRONULAC) 10 g/15 mL solution, Take 10 g by mouth, Disp: , Rfl:     mirtazapine (REMERON) 15 mg tablet, Take 1 tablet (15 mg total) by mouth daily at bedtime, Disp: 30 tablet, Rfl: 0    nystatin (MYCOSTATIN) cream, Apply topically 2 (two) times a day, Disp: , Rfl:     OLANZapine (ZyPREXA) 10 mg tablet, Take 1 tablet (10 mg total) by mouth daily at bedtime, Disp: 30 tablet, Rfl: 0    OLANZapine (ZyPREXA) 20 MG tablet, Take 20 mg by mouth daily at bedtime, Disp: , Rfl:     phentermine 37.5 MG capsule, Take 37.5 mg by mouth daily, Disp: , Rfl:     traZODone (DESYREL) 50 mg tablet, Take 100 mg by mouth daily at bedtime, Disp: , Rfl:     triamcinolone (KENALOG) 0.1 % cream, Apply topically 2 (two) times a day, Disp: , Rfl:     meloxicam (MOBIC) 7.5 mg tablet, take 1 tablet by mouth once daily for pain (Patient not taking: Reported on 2/11/2025), Disp: , Rfl:     methylPREDNISolone 4 MG tablet therapy pack, Use as directed on package (Patient not taking: Reported on 2/11/2025), Disp: 21 tablet, Rfl: 0  No current facility-administered medications for this visit.    Current  Allergies     Allergies as of 02/11/2025 - Reviewed 02/11/2025   Allergen Reaction Noted    Tramadol Itching 06/14/2017    Tramadol Itching 08/23/2024              The following portions of the patient's history were reviewed and updated as appropriate: allergies, current medications, past family history, past medical history, past social history, past surgical history, and problem list.     Past Medical History:   Diagnosis Date    Medial meniscus tear     left knee       Past Surgical History:   Procedure Laterality Date    KNEE SURGERY      NO PAST SURGERIES      [place] ARTHRS KNE SURG W/MENISCECTOMY MED/LAT W/SHVG Left 12/4/2017    Procedure: KNEE ARTHROSCOPY; CHONDROPLASTY; SYNOVECTOMY;  Surgeon: Anahy Arceo MD;  Location: [facility];  Service: Orthopedics       Family History   Problem Relation Age of Onset    Diabetes Father          Medications have been verified.        Objective     /84   Pulse (!) 107   Temp (!) 96.9 °F (36.1 °C)   Resp 14   Wt 81.6 kg (180 lb)   SpO2 98%   BMI 30.90 kg/m²   No LMP recorded.         Physical Exam     Physical Exam  Vitals and nursing note reviewed.   Constitutional:       Appearance: Normal appearance. She is normal weight.   HENT:      Head: Normocephalic and atraumatic.      Nose: Nose normal.      Mouth/Throat:      Mouth: Mucous membranes are moist.      Pharynx: Oropharynx is clear.   Eyes:      General:         Right eye: No discharge.         Left eye: No discharge.      Extraocular Movements: Extraocular movements intact.      Conjunctiva/sclera: Conjunctivae normal.      Pupils: Pupils are equal, round, and reactive to light.   Cardiovascular:      Rate and Rhythm: Normal rate and regular rhythm.      Heart sounds: Normal heart sounds.   Pulmonary:      Effort: Pulmonary effort is normal.      Breath sounds: Normal breath sounds.   Musculoskeletal:      Cervical back: Normal range of motion and neck supple.   Skin:     General: Skin is warm and  dry.   Neurological:      General: No focal deficit present.      Mental Status: She is alert and oriented to person, place, and time.      Cranial Nerves: No cranial nerve deficit.      Motor: No weakness.      Coordination: Coordination normal.      Gait: Gait normal.   Psychiatric:         Mood and Affect: Mood normal.         Behavior: Behavior normal.

## 2025-02-11 NOTE — PATIENT INSTRUCTIONS
No tome ibuprofeno de venta vishal dami 8-12 horas desde el momento de la inyección recibida hoy    Pinon ibuprofeno o paracetamol de venta vishal jerald se indica en el empaque para los bernadette de leeanne    Acuda a la pilo de emergencias ante cualquier síntoma grave o persistente

## (undated) DEVICE — NEEDLE 25G X 1 1/2

## (undated) DEVICE — ACE WRAP 6 IN UNSTERILE

## (undated) DEVICE — GLOVE SRG BIOGEL 8

## (undated) DEVICE — TUBING ARTHROSCOPIC WAVE  MAIN PUMP

## (undated) DEVICE — CHLORAPREP HI-LITE 26ML ORANGE

## (undated) DEVICE — SUT ETHILON 3-0 FS-1 18 IN 663G

## (undated) DEVICE — INTENDED FOR TISSUE SEPARATION, AND OTHER PROCEDURES THAT REQUIRE A SHARP SURGICAL BLADE TO PUNCTURE OR CUT.: Brand: BARD-PARKER ® CARBON RIB-BACK BLADES

## (undated) DEVICE — SPONGE PVP SCRUB WING STERILE

## (undated) DEVICE — GAUZE SPONGES,16 PLY: Brand: CURITY

## (undated) DEVICE — ARTHROSCOPY FLOOR MAT

## (undated) DEVICE — PACK UNIVERSAL ARTHRSCOPY PBDS

## (undated) DEVICE — ACE WRAP 6 IN STERILE

## (undated) DEVICE — NEEDLE 18 G X 1 1/2

## (undated) DEVICE — GLOVE INDICATOR PI UNDERGLOVE SZ 8.5 BLUE

## (undated) DEVICE — OCCLUSIVE GAUZE STRIP,3% BISMUTH TRIBROMOPHENATE IN PETROLATUM BLEND: Brand: XEROFORM

## (undated) DEVICE — PADDING CAST 4 IN  COTTON STRL

## (undated) DEVICE — LIGHT GLOVE GREEN

## (undated) DEVICE — IMPERVIOUS STOCKINETTE: Brand: DEROYAL

## (undated) DEVICE — ABDOMINAL PAD: Brand: DERMACEA

## (undated) DEVICE — U-DRAPE: Brand: CONVERTORS